# Patient Record
Sex: MALE | Race: OTHER | HISPANIC OR LATINO | Employment: FULL TIME | URBAN - METROPOLITAN AREA
[De-identification: names, ages, dates, MRNs, and addresses within clinical notes are randomized per-mention and may not be internally consistent; named-entity substitution may affect disease eponyms.]

---

## 2019-08-29 ENCOUNTER — APPOINTMENT (EMERGENCY)
Dept: CT IMAGING | Facility: HOSPITAL | Age: 54
End: 2019-08-29

## 2019-08-29 ENCOUNTER — HOSPITAL ENCOUNTER (EMERGENCY)
Facility: HOSPITAL | Age: 54
Discharge: HOME | End: 2019-08-29
Attending: EMERGENCY MEDICINE

## 2019-08-29 VITALS
RESPIRATION RATE: 18 BRPM | OXYGEN SATURATION: 98 % | SYSTOLIC BLOOD PRESSURE: 129 MMHG | DIASTOLIC BLOOD PRESSURE: 81 MMHG | HEART RATE: 78 BPM | TEMPERATURE: 97.8 F

## 2019-08-29 DIAGNOSIS — M54.50 ACUTE LOW BACK PAIN: Primary | ICD-10-CM

## 2019-08-29 PROCEDURE — 72131 CT LUMBAR SPINE W/O DYE: CPT

## 2019-08-29 PROCEDURE — 72128 CT CHEST SPINE W/O DYE: CPT

## 2019-08-29 PROCEDURE — 99284 EMERGENCY DEPT VISIT MOD MDM: CPT | Performed by: EMERGENCY MEDICINE

## 2019-08-29 PROCEDURE — 99283 EMERGENCY DEPT VISIT LOW MDM: CPT

## 2019-08-29 RX ORDER — CYCLOBENZAPRINE HCL 10 MG
10 TABLET ORAL ONCE
Status: COMPLETED | OUTPATIENT
Start: 2019-08-29 | End: 2019-08-29

## 2019-08-29 RX ORDER — NAPROXEN 375 MG/1
375 TABLET ORAL 2 TIMES DAILY WITH MEALS
Qty: 20 TABLET | Refills: 0 | Status: SHIPPED | OUTPATIENT
Start: 2019-08-29 | End: 2019-09-08

## 2019-08-29 RX ORDER — CYCLOBENZAPRINE HCL 10 MG
10 TABLET ORAL 3 TIMES DAILY PRN
Qty: 30 TABLET | Refills: 0 | Status: SHIPPED | OUTPATIENT
Start: 2019-08-29

## 2019-08-29 RX ADMIN — CYCLOBENZAPRINE HYDROCHLORIDE 10 MG: 10 TABLET, FILM COATED ORAL at 17:26

## 2019-08-29 NOTE — DISCHARGE INSTRUCTIONS
Rest  Heat to the area  Naprosyn twice daily to reduce inflammation  Flexeril every 8 hours if needed for muscle spasm caution may make a sleepy  Follow-up through the [facility]

## 2019-08-29 NOTE — ED PROVIDER NOTES
History  Chief Complaint   Patient presents with    Back Pain     Pt presents to ED with back pain x3 weeks after injury at work  Radiating to groin  HPI patient is a 61-year-old male, apparently moving a refrigerator and felt a snap or pop in his back  Patient is being seen by the chiropractor but today apparently had increasing pain and chiropractor reports a loose area along his spine he is concerned about a fracture  Patient was referred to the emergency department for a CT scan to rule out fracture of his thoracic or lumbar spine  Patient reports pain with bending  Patient reports using somewhat muscle relaxers at home with some relief  He denies any numbness or weakness  Past medical history previously healthy  Family history noncontributory  Social history, smoker, employed    None       History reviewed  No pertinent past medical history  History reviewed  No pertinent surgical history  History reviewed  No pertinent family history  I have reviewed and agree with the history as documented  Social History     Tobacco Use    Smoking status: Current Every Day Smoker     Packs/day: 0 20    Smokeless tobacco: Never Used   Substance Use Topics    Alcohol use: Never     Frequency: Never    Drug use: Never        Review of Systems   Constitutional: Negative for fever  HENT: Negative for congestion  Eyes: Negative for pain and redness  Respiratory: Negative for cough and shortness of breath  Cardiovascular: Negative for chest pain  Gastrointestinal: Negative for abdominal pain and vomiting  Musculoskeletal: Positive for back pain  Physical Exam  Physical Exam   Constitutional: He is oriented to person, place, and time  He appears well-developed and well-nourished  HENT:   Head: Normocephalic     Right Ear: External ear normal    Left Ear: External ear normal    Nose: Nose normal    Mouth/Throat: Oropharynx is clear and moist    Eyes: Pupils are equal, round, and reactive to light  EOM and lids are normal    Neck: Normal range of motion  Neck supple  Pulmonary/Chest: Effort normal  No respiratory distress  Abdominal: Soft  There is no tenderness  Musculoskeletal: Normal range of motion  He exhibits no deformity  There is left paraspinal thoracic spine tenderness approximately around T9-T10, there is tenderness of the lumbar spine, no distal weakness, normal strength, neurovascular tendon intact  Neurological: He is alert and oriented to person, place, and time  Skin: Skin is warm and dry  Psychiatric: He has a normal mood and affect  Nursing note and vitals reviewed  Vital Signs  ED Triage Vitals [08/29/19 1712]   Temperature Pulse Respirations Blood Pressure SpO2   97 8 °F (36 6 °C) 78 18 129/81 98 %      Temp Source Heart Rate Source Patient Position - Orthostatic VS BP Location FiO2 (%)   Oral Monitor Standing Right arm --      Pain Score       Worst Possible Pain           Vitals:    08/29/19 1712   BP: 129/81   Pulse: 78   Patient Position - Orthostatic VS: Standing         Visual Acuity      ED Medications  Medications   cyclobenzaprine (FLEXERIL) tablet 10 mg (10 mg Oral Given 8/29/19 1726)       Diagnostic Studies  Results Reviewed     None                 CT spine thoracic & lumbar wo contrast   Final Result by Jolynn Gowers, MD (08/29 1818)      No evidence of thoracic or lumbar spine fracture, osseous lesion or paraspinal mass  Minimal degenerative change  Workstation performed: [id number]                    Procedures  Procedures       ED Course            patient reports hearing a pop or a snap during the episode when he was moving a refrigerator, his chiropractor was concern for fracture so the patient required CT scan which was normal   Discussed with patient advised comprehensive spine program   Discussed analgesics, discussed muscle relaxers    Patient received muscle relaxers here with good improvement  [facility] medical decision making 61-year-old male, reports moving a refrigerator and felt a snap and pop in his back, apparently his chiropractor felt a loose area on his spine that he thought was a fracture  CT shows no fracture here  Discussed outpatient treatment and follow-up   We discussed indications to return  Disposition  Final diagnoses:   Acute low back pain     Time reflects when diagnosis was documented in both MDM as applicable and the Disposition within this note     Time User Action Codes Description Comment    8/29/2019  6:22 PM John Krause Florin [M54 5] Acute low back pain       ED Disposition     ED Disposition Condition Date/Time Comment    Discharge Stable Thu Aug 29, 2019  6:22 PM Madan Herrera discharge to home/self care              Follow-up Information     Follow up With Specialties Details Why Contact Info    [facility] Physical Therapy   [phone]          Discharge Medication List as of 8/29/2019  6:25 PM      START taking these medications    Details   cyclobenzaprine (FLEXERIL) 10 mg tablet Take 1 tablet (10 mg total) by mouth 3 (three) times a day as needed for muscle spasms for up to 30 doses, Starting Thu 8/29/2019, Print      naproxen (NAPROSYN) 375 mg tablet Take 1 tablet (375 mg total) by mouth 2 (two) times a day with meals for 20 doses, Starting Thu 8/29/2019, Until Sun 9/8/2019, Print               ED Provider  Electronically Signed by           Gifty Payne MD  08/29/19 0337

## 2019-08-30 ENCOUNTER — TELEPHONE (OUTPATIENT)
Dept: PHYSICAL THERAPY | Facility: OTHER | Age: 54
End: 2019-08-30

## 2019-12-24 ENCOUNTER — APPOINTMENT (EMERGENCY)
Dept: RADIOLOGY | Facility: HOSPITAL | Age: 54
End: 2019-12-24
Payer: COMMERCIAL

## 2019-12-24 ENCOUNTER — HOSPITAL ENCOUNTER (EMERGENCY)
Facility: HOSPITAL | Age: 54
Discharge: HOME | End: 2019-12-24
Attending: EMERGENCY MEDICINE
Payer: COMMERCIAL

## 2019-12-24 VITALS
OXYGEN SATURATION: 96 % | TEMPERATURE: 98.8 F | DIASTOLIC BLOOD PRESSURE: 82 MMHG | WEIGHT: 208 LBS | BODY MASS INDEX: 30.81 KG/M2 | SYSTOLIC BLOOD PRESSURE: 136 MMHG | RESPIRATION RATE: 18 BRPM | HEART RATE: 87 BPM | HEIGHT: 69 IN

## 2019-12-24 DIAGNOSIS — S61.012A LACERATION OF LEFT THUMB WITHOUT FOREIGN BODY WITHOUT DAMAGE TO NAIL, INITIAL ENCOUNTER: Primary | ICD-10-CM

## 2019-12-24 PROCEDURE — 73140 X-RAY EXAM OF FINGER(S): CPT

## 2019-12-24 PROCEDURE — 99283 EMERGENCY DEPT VISIT LOW MDM: CPT

## 2019-12-24 PROCEDURE — 12001 RPR S/N/AX/GEN/TRNK 2.5CM/<: CPT | Performed by: EMERGENCY MEDICINE

## 2019-12-24 PROCEDURE — 99283 EMERGENCY DEPT VISIT LOW MDM: CPT | Performed by: EMERGENCY MEDICINE

## 2019-12-24 RX ORDER — BACITRACIN, NEOMYCIN, POLYMYXIN B 400; 3.5; 5 [USP'U]/G; MG/G; [USP'U]/G
1 OINTMENT TOPICAL ONCE
Status: DISCONTINUED | OUTPATIENT
Start: 2019-12-24 | End: 2019-12-25 | Stop reason: HOSPADM

## 2019-12-24 RX ORDER — LIDOCAINE HYDROCHLORIDE 10 MG/ML
4 INJECTION, SOLUTION EPIDURAL; INFILTRATION; INTRACAUDAL; PERINEURAL ONCE
Status: DISCONTINUED | OUTPATIENT
Start: 2019-12-24 | End: 2019-12-25 | Stop reason: HOSPADM

## 2019-12-25 NOTE — ED PROVIDER NOTES
History  Chief Complaint   Patient presents with    Laceration     pt reports left thumb laceration while cooking       Laceration   Location:  Finger  Finger laceration location:  L thumb  Length:  2  Depth: Through dermis  Quality: straight    Bleeding: venous    Time since incident:  1 hour  Laceration mechanism:  Knife (Pt was cooking and accidentally cut left thumb with knife)  Pain details:     Quality:  Aching    Severity:  Mild    Timing:  Constant    Progression:  Unchanged  Foreign body present:  No foreign bodies  Relieved by:  Pressure  Worsened by:  Nothing  Ineffective treatments:  None tried  Tetanus status:  Up to date  Associated symptoms: no numbness and no swelling        Prior to Admission Medications   Prescriptions Last Dose Informant Patient Reported? Taking? cyclobenzaprine (FLEXERIL) 10 mg tablet   No No   Sig: Take 1 tablet (10 mg total) by mouth 3 (three) times a day as needed for muscle spasms for up to 30 doses   naproxen (NAPROSYN) 375 mg tablet   No No   Sig: Take 1 tablet (375 mg total) by mouth 2 (two) times a day with meals for 20 doses      Facility-Administered Medications: None       History reviewed  No pertinent past medical history  History reviewed  No pertinent surgical history  History reviewed  No pertinent family history  I have reviewed and agree with the history as documented  Social History     Tobacco Use    Smoking status: Current Every Day Smoker     Packs/day: 0 20    Smokeless tobacco: Never Used   Substance Use Topics    Alcohol use: Never     Frequency: Never    Drug use: Never        Review of Systems   All other systems reviewed and are negative  Physical Exam  Physical Exam   Constitutional: He appears well-developed and well-nourished  Cardiovascular: Normal rate and regular rhythm  Pulmonary/Chest: Effort normal    Musculoskeletal:        Left hand: He exhibits laceration (2cm x 1mm  laceration)   He exhibits normal range of motion, no tenderness, normal capillary refill, no deformity and no swelling  Hands:  Neurological: He is alert  Vitals reviewed  Vital Signs  ED Triage Vitals [12/24/19 2047]   Temperature Pulse Respirations Blood Pressure SpO2   98 8 °F (37 1 °C) 87 18 136/82 96 %      Temp Source Heart Rate Source Patient Position - Orthostatic VS BP Location FiO2 (%)   Oral Monitor Sitting Right arm --      Pain Score       No Pain           Vitals:    12/24/19 2047   BP: 136/82   Pulse: 87   Patient Position - Orthostatic VS: Sitting         Visual Acuity      ED Medications  Medications - No data to display    Diagnostic Studies  Results Reviewed     None                 XR thumb 2 views LEFT   ED Interpretation by Odessa Lee MD (12/24 2202)   Laceration, no bony involvement      Final Result by Pauline Willis MD (12/25 8929)      Soft tissue laceration overlying the left thumb distal phalanx without an acute osseous abnormality  Workstation performed: [id number]                    Procedures  Laceration repair  Date/Time: 12/24/2019 9:57 PM  Performed by: Odessa Lee MD  Authorized by: Odessa Lee MD   Consent: Verbal consent obtained  Consent given by: patient  Anesthesia: digital block    Anesthesia:  Local Anesthetic: lidocaine 1% without epinephrine  Anesthetic total: 4 mL      Procedure Details:  Preparation: Patient was prepped and draped in the usual sterile fashion    Irrigation solution: saline  Irrigation method: syringe  Amount of cleaning: standard  Debridement: none  Degree of undermining: none  Skin closure: 4-0 nylon  Number of sutures: 4  Technique: simple  Approximation: close  Approximation difficulty: simple  Dressing: antibiotic ointment  Patient tolerance: Patient tolerated the procedure well with no immediate complications  Comments: Partial anesthesia with digital block, pt requested no additional medications               ED Course MDM  Number of Diagnoses or Management Options  Laceration of left thumb without foreign body without damage to nail, initial encounter: new and does not require workup     Amount and/or Complexity of Data Reviewed  Tests in the radiology section of CPT®: ordered  Independent visualization of images, tracings, or specimens: yes    Patient Progress  Patient progress: stable        Disposition  Final diagnoses:   Laceration of left thumb without foreign body without damage to nail, initial encounter     Time reflects when diagnosis was documented in both MDM as applicable and the Disposition within this note     Time User Action Codes Description Comment    12/24/2019  9:58 PM Michelle  [address], [address] Laceration of left thumb without foreign body without damage to nail, initial encounter       ED Disposition     ED Disposition Condition Date/Time Comment    Discharge Stable Tue Dec 24, 2019  9:58 PM Coreen Dumont discharge to home/self care  Follow-up Information     Follow up With Specialties Details Why Contact Info Additional [address] [facility] Emergency Department Emergency Medicine Go to  If symptoms worsen, and to have stitches removed in 7-10 days  [address] [facility] [id number]  [address] [place] [address], [address], [address]          Discharge Medication List as of 12/24/2019  9:59 PM      CONTINUE these medications which have NOT CHANGED    Details   cyclobenzaprine (FLEXERIL) 10 mg tablet Take 1 tablet (10 mg total) by mouth 3 (three) times a day as needed for muscle spasms for up to 30 doses, Starting u 8/29/2019, Print      naproxen (NAPROSYN) 375 mg tablet Take 1 tablet (375 mg total) by mouth 2 (two) times a day with meals for 20 doses, Starting Thu 8/29/2019, Until Sun 9/8/2019, Print           No discharge procedures on file      ED Provider  Electronically Signed by Ollie Luu MD  12/28/19 5670

## 2022-01-01 ENCOUNTER — HOSPITAL ENCOUNTER (EMERGENCY)
Facility: HOSPITAL | Age: 57
Discharge: HOME | End: 2022-01-01
Attending: EMERGENCY MEDICINE
Payer: COMMERCIAL

## 2022-01-01 VITALS
WEIGHT: 215 LBS | OXYGEN SATURATION: 94 % | HEIGHT: 69 IN | TEMPERATURE: 98.6 F | SYSTOLIC BLOOD PRESSURE: 116 MMHG | RESPIRATION RATE: 16 BRPM | BODY MASS INDEX: 31.84 KG/M2 | DIASTOLIC BLOOD PRESSURE: 58 MMHG | HEART RATE: 74 BPM

## 2022-01-01 DIAGNOSIS — M54.50 ACUTE LOW BACK PAIN: Primary | ICD-10-CM

## 2022-01-01 PROCEDURE — 96361 HYDRATE IV INFUSION ADD-ON: CPT

## 2022-01-01 PROCEDURE — 96372 THER/PROPH/DIAG INJ SC/IM: CPT

## 2022-01-01 PROCEDURE — 96374 THER/PROPH/DIAG INJ IV PUSH: CPT

## 2022-01-01 PROCEDURE — 96375 TX/PRO/DX INJ NEW DRUG ADDON: CPT

## 2022-01-01 PROCEDURE — 99283 EMERGENCY DEPT VISIT LOW MDM: CPT

## 2022-01-01 PROCEDURE — 99284 EMERGENCY DEPT VISIT MOD MDM: CPT | Performed by: EMERGENCY MEDICINE

## 2022-01-01 RX ORDER — HYDROMORPHONE HCL/PF 1 MG/ML
1 SYRINGE (ML) INJECTION ONCE
Status: COMPLETED | OUTPATIENT
Start: 2022-01-01 | End: 2022-01-01

## 2022-01-01 RX ORDER — ONDANSETRON 2 MG/ML
4 INJECTION INTRAMUSCULAR; INTRAVENOUS ONCE
Status: COMPLETED | OUTPATIENT
Start: 2022-01-01 | End: 2022-01-01

## 2022-01-01 RX ORDER — CYCLOBENZAPRINE HCL 10 MG
10 TABLET ORAL ONCE
Status: COMPLETED | OUTPATIENT
Start: 2022-01-01 | End: 2022-01-01

## 2022-01-01 RX ORDER — DIAZEPAM 5 MG/ML
2.5 INJECTION, SOLUTION INTRAMUSCULAR; INTRAVENOUS ONCE
Status: COMPLETED | OUTPATIENT
Start: 2022-01-01 | End: 2022-01-01

## 2022-01-01 RX ORDER — HYDROMORPHONE HCL/PF 1 MG/ML
0.5 SYRINGE (ML) INJECTION ONCE
Status: COMPLETED | OUTPATIENT
Start: 2022-01-01 | End: 2022-01-01

## 2022-01-01 RX ORDER — KETOROLAC TROMETHAMINE 30 MG/ML
15 INJECTION, SOLUTION INTRAMUSCULAR; INTRAVENOUS ONCE
Status: COMPLETED | OUTPATIENT
Start: 2022-01-01 | End: 2022-01-01

## 2022-01-01 RX ORDER — CYCLOBENZAPRINE HCL 10 MG
10 TABLET ORAL 3 TIMES DAILY PRN
Qty: 30 TABLET | Refills: 0 | Status: SHIPPED | OUTPATIENT
Start: 2022-01-01

## 2022-01-01 RX ORDER — OXYCODONE HYDROCHLORIDE AND ACETAMINOPHEN 5; 325 MG/1; MG/1
2 TABLET ORAL ONCE
Status: COMPLETED | OUTPATIENT
Start: 2022-01-01 | End: 2022-01-01

## 2022-01-01 RX ORDER — OXYCODONE HYDROCHLORIDE AND ACETAMINOPHEN 5; 325 MG/1; MG/1
1 TABLET ORAL EVERY 6 HOURS PRN
Qty: 25 TABLET | Refills: 0 | Status: SHIPPED | OUTPATIENT
Start: 2022-01-01

## 2022-01-01 RX ADMIN — SODIUM CHLORIDE 1000 ML: 0.9 INJECTION, SOLUTION INTRAVENOUS at 17:09

## 2022-01-01 RX ADMIN — OXYCODONE HYDROCHLORIDE AND ACETAMINOPHEN 2 TABLET: 5; 325 TABLET ORAL at 18:29

## 2022-01-01 RX ADMIN — HYDROMORPHONE HYDROCHLORIDE 1 MG: 1 INJECTION, SOLUTION INTRAMUSCULAR; INTRAVENOUS; SUBCUTANEOUS at 19:23

## 2022-01-01 RX ADMIN — CYCLOBENZAPRINE HYDROCHLORIDE 10 MG: 10 TABLET, FILM COATED ORAL at 18:29

## 2022-01-01 RX ADMIN — DIAZEPAM 2.5 MG: 10 INJECTION, SOLUTION INTRAMUSCULAR; INTRAVENOUS at 17:07

## 2022-01-01 RX ADMIN — HYDROMORPHONE HYDROCHLORIDE 0.5 MG: 1 INJECTION, SOLUTION INTRAMUSCULAR; INTRAVENOUS; SUBCUTANEOUS at 17:07

## 2022-01-01 RX ADMIN — ONDANSETRON 4 MG: 2 INJECTION INTRAMUSCULAR; INTRAVENOUS at 17:08

## 2022-01-01 RX ADMIN — KETOROLAC TROMETHAMINE 15 MG: 30 INJECTION, SOLUTION INTRAMUSCULAR at 17:08

## 2022-01-01 NOTE — DISCHARGE INSTRUCTIONS
Rest  Heat to the area  Flexeril every 8 hours as needed for muscle spasm  Percocet 1 every 6 hours as needed for pain caution narcotic will make you sleepy  Follow up with your provider or through the [facility]

## 2022-01-01 NOTE — ED PROVIDER NOTES
History  Chief Complaint   Patient presents with    Back Pain     pt states he was walking earlier today when he felt like he pulled his lower back and is now c/o severe pain  HPI patient is a 37-year-old male reports he was taking out the trash, walking and turned and felt a pull in his right low back  Patient reports a history of herniated disc  Apparently had injections in the past   Patient reports that cases closed  He reports today he was well until he twisted and pulled his back  Now has severe pain and unable to get out of a chair  Wife reports his leg seems swollen since the injury  Patient denies any fever or chills  Denies any incontinence  Denies any numbness  He complains primarily of severe pain which radiates from the right lumbar area down his right leg  Complains of pain with any movement of his right leg  I saw the patient in August of 2019 when he felt something popped in his back and we performed a CT scan at that time which showed no fracture  Past medical history of back problems  Family history noncontributory  Social history, smoker, denies drug abuse    Prior to Admission Medications   Prescriptions Last Dose Informant Patient Reported? Taking? cyclobenzaprine (FLEXERIL) 10 mg tablet   No No   Sig: Take 1 tablet (10 mg total) by mouth 3 (three) times a day as needed for muscle spasms for up to 30 doses   naproxen (NAPROSYN) 375 mg tablet   No No   Sig: Take 1 tablet (375 mg total) by mouth 2 (two) times a day with meals for 20 doses      Facility-Administered Medications: None       History reviewed  No pertinent past medical history  History reviewed  No pertinent surgical history  History reviewed  No pertinent family history  I have reviewed and agree with the history as documented      E-Cigarette/Vaping     E-Cigarette/Vaping Substances     Social History     Tobacco Use    Smoking status: Current Every Day Smoker     Packs/day: 0 20     Types: Cigarettes    Smokeless tobacco: Never Used   Substance Use Topics    Alcohol use: Never    Drug use: Never       Review of Systems   Constitutional: Negative for diaphoresis, fatigue and fever  HENT: Negative for congestion, ear pain, nosebleeds and sore throat  Eyes: Negative for photophobia, pain, discharge and visual disturbance  Respiratory: Negative for cough, choking, chest tightness, shortness of breath and wheezing  Cardiovascular: Negative for chest pain and palpitations  Gastrointestinal: Negative for abdominal distention, abdominal pain, diarrhea and vomiting  Genitourinary: Negative for dysuria, flank pain and frequency  Musculoskeletal: Positive for back pain  Negative for gait problem and joint swelling  Skin: Negative for color change and rash  Neurological: Negative for dizziness, syncope and headaches  Psychiatric/Behavioral: Negative for behavioral problems and confusion  The patient is not nervous/anxious  All other systems reviewed and are negative  Physical Exam  Physical Exam  Vitals and nursing note reviewed  Constitutional:       Appearance: He is well-developed  HENT:      Head: Normocephalic  Right Ear: External ear normal       Left Ear: External ear normal       Nose: Nose normal    Eyes:      General: Lids are normal       Pupils: Pupils are equal, round, and reactive to light  Cardiovascular:      Rate and Rhythm: Normal rate and regular rhythm  Pulses: Normal pulses  Heart sounds: Normal heart sounds  Pulmonary:      Effort: Pulmonary effort is normal  No respiratory distress  Musculoskeletal:         General: No deformity  Cervical back: Normal range of motion and neck supple  Comments: Right-sided low back tenderness, pain with any movement, positive right-sided straight leg raise, patient does have good distal pulses in the right leg  He has flexion extension of his toes  He has dorsiflexion and plantar flexion of the foot  Neurovascular tendon intact   Skin:     General: Skin is warm and dry  Neurological:      Mental Status: He is alert and oriented to person, place, and time  Vital Signs  ED Triage Vitals [01/01/22 1610]   Temperature Pulse Respirations Blood Pressure SpO2   97 8 °F (36 6 °C) 99 17 144/83 96 %      Temp Source Heart Rate Source Patient Position - Orthostatic VS BP Location FiO2 (%)   Temporal Monitor Sitting Left arm --      Pain Score       10 - Worst Possible Pain           Vitals:    01/01/22 1610 01/01/22 1809   BP: 144/83 116/58   Pulse: 99 74   Patient Position - Orthostatic VS: Sitting Lying         Visual Acuity      ED Medications  Medications   sodium chloride 0 9 % bolus 1,000 mL (1,000 mL Intravenous New Bag 1/1/22 1709)   ondansetron (ZOFRAN) injection 4 mg (4 mg Intravenous Given 1/1/22 1708)   HYDROmorphone (DILAUDID) injection 0 5 mg (0 5 mg Intravenous Given 1/1/22 1707)   diazepam (VALIUM) injection 2 5 mg (2 5 mg Intravenous Given 1/1/22 1707)   ketorolac (TORADOL) injection 15 mg (15 mg Intravenous Given 1/1/22 1708)   oxyCODONE-acetaminophen (PERCOCET) 5-325 mg per tablet 2 tablet (2 tablets Oral Given 1/1/22 1829)   cyclobenzaprine (FLEXERIL) tablet 10 mg (10 mg Oral Given 1/1/22 1829)   HYDROmorphone (DILAUDID) injection 1 mg (1 mg Intramuscular Given 1/1/22 1923)       Diagnostic Studies  Results Reviewed     None                 No orders to display              Procedures  Procedures         ED Course      following pain meds the patient was able to move and stand I was able to get him up and move him to the bed  Patient was ambulatory  We discussed all the drug stores are closed the patient would need medications 1st tonight for pain control  Patient reports when he twisted he developed increasing pain so patient was given additional IM medications  Patient was able to stand and walk after the IM medications    We discussed follow-up we discussed slow movement and back support  Patient meets Back pain low risk criteria, no trauma, no fever chills or weight loss, no neurological deficit, no history of cancer, no history of injecting drugs, pain improved with rest                   SBIRT 22yo+      Most Recent Value   SBIRT (22 yo +)    In order to provide better care to our patients, we are screening all of our patients for alcohol and drug use  Would it be okay to ask you these screening questions? Yes Filed at: 01/01/2022 1630   Initial Alcohol Screen: US AUDIT-C     1  How often do you have a drink containing alcohol? 0 Filed at: 01/01/2022 1630   2  How many drinks containing alcohol do you have on a typical day you are drinking? 0 Filed at: 01/01/2022 1630   3a  Male UNDER 65: How often do you have five or more drinks on one occasion? 0 Filed at: 01/01/2022 1630   3b  FEMALE Any Age, or MALE 65+: How often do you have 4 or more drinks on one occassion? 0 Filed at: 01/01/2022 1630   Audit-C Score 0 Filed at: 01/01/2022 1630   NARDA: How many times in the past year have you    Used an illegal drug or used a prescription medication for non-medical reasons? Never Filed at: 01/01/2022 1630                    [facility] medical decision making 56-year-old male, twisted while taking out the trash  complains of a severe pull in his low back  Severe pain with movement, improved with analgesics  No focal deficits, no red flags,  Discussed outpatient treatment follow-up discussed indications to return  Disposition  Final diagnoses:   Acute low back pain     Time reflects when diagnosis was documented in both MDM as applicable and the Disposition within this note     Time User Action Codes Description Comment    1/1/2022  6:18 PM aSra Harris Add [M54 50] Acute low back pain       ED Disposition     ED Disposition Condition Date/Time Comment    Discharge Stable Sat Jan 1, 2022  6:18 PM Edie Durand discharge to home/self care              Follow-up Information     Follow up With Specialties Details Why Contact Info Additional Information    [facility]   [phone]          Patient's Medications   Discharge Prescriptions    CYCLOBENZAPRINE (FLEXERIL) 10 MG TABLET    Take 1 tablet (10 mg total) by mouth 3 (three) times a day as needed for muscle spasms for up to 30 doses       Start Date: 1/1/2022  End Date: --       Order Dose: 10 mg       Quantity: 30 tablet    Refills: 0    OXYCODONE-ACETAMINOPHEN (PERCOCET) 5-325 MG PER TABLET    Take 1 tablet by mouth every 6 (six) hours as needed for moderate pain or severe pain for up to 25 doses Max Daily Amount: 4 tablets       Start Date: 1/1/2022  End Date: --       Order Dose: 1 tablet       Quantity: 25 tablet    Refills: 0           PDMP Review     None          ED Provider  Electronically Signed by           Lenore Peterson MD  01/01/22 1954

## 2022-01-01 NOTE — ED NOTES
Patient is discharged to home at this time  VSS  IV removed  Medications given  A VS given and patient expressed understanding        Karyle Horns, RN  01/01/22 5787

## 2022-01-03 ENCOUNTER — NURSE TRIAGE (OUTPATIENT)
Dept: PHYSICAL THERAPY | Facility: OTHER | Age: 57
End: 2022-01-03

## 2022-01-03 DIAGNOSIS — M54.50 ACUTE EXACERBATION OF CHRONIC LOW BACK PAIN: Primary | ICD-10-CM

## 2022-01-03 DIAGNOSIS — G89.29 ACUTE EXACERBATION OF CHRONIC LOW BACK PAIN: Primary | ICD-10-CM

## 2022-01-03 NOTE — TELEPHONE ENCOUNTER
Additional Information   Negative: Is this related to a work injury?  Negative: Is this related to an MVA?  Negative: Are you currently recieving homecare services?  Negative: Has the patient had unexplained weight loss?  Negative: Does the patient have a fever?  Negative: Is the patient experiencing urine retention?  Negative: Is the patient experiencing acute drop foot or paralysis?  Negative: Has the patient experienced major trauma? (fall from height, high speed collision, direct blow to spine) and is also experiencing nausea, light-headedness, or loss of consciousness?  Negative: Is the patient experiencing blood in sputum?  Negative: Is this a chronic condition? Background - Initial Assessment  Clinical complaint: right lower back pain which radiates down to the right ankle with numbness and tingling  States he has a history of a work/ back injury 2019 and had injections  States this epispode started on 12/31 when taking out the garbage  Date of onset: 12/31/2021  Frequency of pain: constant  Quality of pain: sharp and stabbing    Protocols used: [facility] COMPREHENSIVE SPINE PROGRAM PROTOCOL    This RN did review in detail the Comprehensive Spine Program and what we can provide for their back pain  Patient is agreeable to being triaged by this RN and would like to proceed with Physical Therapy  Referral was placed for Physical Therapy at the [facility] site  Patients information was sent to the  to make evaluation appointment  Patient made aware that the PT office  will be calling to schedule the appointment  Patient was provided with the phone number to the PT office  No further questions and/or concerns were voiced by the patient at this time  Patient states understanding of the referral that was placed  Referral Closed

## 2022-08-25 ENCOUNTER — APPOINTMENT (OUTPATIENT)
Dept: RADIOLOGY | Facility: HOSPITAL | Age: 57
End: 2022-08-25
Payer: MEDICARE

## 2022-08-25 ENCOUNTER — HOSPITAL ENCOUNTER (EMERGENCY)
Facility: HOSPITAL | Age: 57
Discharge: HOME | End: 2022-08-25
Attending: EMERGENCY MEDICINE
Payer: MEDICARE

## 2022-08-25 VITALS
DIASTOLIC BLOOD PRESSURE: 78 MMHG | TEMPERATURE: 97.9 F | SYSTOLIC BLOOD PRESSURE: 133 MMHG | HEIGHT: 69 IN | HEART RATE: 87 BPM | WEIGHT: 240 LBS | BODY MASS INDEX: 35.55 KG/M2 | RESPIRATION RATE: 18 BRPM | OXYGEN SATURATION: 97 %

## 2022-08-25 DIAGNOSIS — M25.462 EFFUSION OF LEFT KNEE: ICD-10-CM

## 2022-08-25 DIAGNOSIS — S62.501A FRACTURE OF UNSPECIFIED PHALANX OF RIGHT THUMB, INITIAL ENCOUNTER FOR CLOSED FRACTURE: Primary | ICD-10-CM

## 2022-08-25 PROCEDURE — 99283 EMERGENCY DEPT VISIT LOW MDM: CPT

## 2022-08-25 PROCEDURE — 73564 X-RAY EXAM KNEE 4 OR MORE: CPT

## 2022-08-25 PROCEDURE — 29125 APPL SHORT ARM SPLINT STATIC: CPT | Performed by: NURSE PRACTITIONER

## 2022-08-25 PROCEDURE — 99284 EMERGENCY DEPT VISIT MOD MDM: CPT | Performed by: NURSE PRACTITIONER

## 2022-08-25 PROCEDURE — 73130 X-RAY EXAM OF HAND: CPT

## 2022-08-25 RX ORDER — OXYCODONE HYDROCHLORIDE AND ACETAMINOPHEN 5; 325 MG/1; MG/1
1 TABLET ORAL EVERY 6 HOURS PRN
Qty: 20 TABLET | Refills: 0 | Status: SHIPPED | OUTPATIENT
Start: 2022-08-25

## 2022-08-25 RX ORDER — HYDROCODONE BITARTRATE AND ACETAMINOPHEN 5; 325 MG/1; MG/1
1 TABLET ORAL ONCE
Status: COMPLETED | OUTPATIENT
Start: 2022-08-25 | End: 2022-08-25

## 2022-08-25 RX ADMIN — HYDROCODONE BITARTRATE AND ACETAMINOPHEN 1 TABLET: 5; 325 TABLET ORAL at 19:38

## 2022-08-25 NOTE — ED PROVIDER NOTES
History  Chief Complaint   Patient presents with    Fall     Pt states he tripped over a jet skit trailer and dislocated his left knee but pushed it back in and felt the weight of his body fall onto his right hand and injured his right thumb  Denies head strike and denies thinners      65-year-old male presenting here after having a fall  Accidentally tripped and tried to brace himself and protect his face and injured his right thumb in his left knee  He reports his right thumb was dislocated his left knee was also dislocated  Prior to Admission Medications   Prescriptions Last Dose Informant Patient Reported? Taking? cyclobenzaprine (FLEXERIL) 10 mg tablet   No No   Sig: Take 1 tablet (10 mg total) by mouth 3 (three) times a day as needed for muscle spasms for up to 30 doses   cyclobenzaprine (FLEXERIL) 10 mg tablet   No No   Sig: Take 1 tablet (10 mg total) by mouth 3 (three) times a day as needed for muscle spasms for up to 30 doses   naproxen (NAPROSYN) 375 mg tablet   No No   Sig: Take 1 tablet (375 mg total) by mouth 2 (two) times a day with meals for 20 doses   oxyCODONE-acetaminophen (PERCOCET) 5-325 mg per tablet   No No   Sig: Take 1 tablet by mouth every 6 (six) hours as needed for moderate pain or severe pain for up to 25 doses Max Daily Amount: 4 tablets      Facility-Administered Medications: None       No past medical history on file  No past surgical history on file  No family history on file  I have reviewed and agree with the history as documented  E-Cigarette/Vaping     E-Cigarette/Vaping Substances     Social History     Tobacco Use    Smoking status: Current Every Day Smoker     Packs/day: 0 20     Types: Cigarettes    Smokeless tobacco: Never Used   Substance Use Topics    Alcohol use: Never    Drug use: Never       Review of Systems   Constitutional: Negative for diaphoresis, fatigue and fever  HENT: Negative for congestion, ear pain, nosebleeds and sore throat  Eyes: Negative for photophobia, pain, discharge and visual disturbance  Respiratory: Negative for cough, choking, chest tightness, shortness of breath and wheezing  Cardiovascular: Negative for chest pain and palpitations  Gastrointestinal: Negative for abdominal distention, abdominal pain, diarrhea and vomiting  Genitourinary: Negative for dysuria, flank pain and frequency  Musculoskeletal: Positive for gait problem  Negative for back pain and joint swelling  Skin: Negative for color change and rash  Neurological: Negative for dizziness, syncope and headaches  Psychiatric/Behavioral: Negative for behavioral problems and confusion  The patient is not nervous/anxious  All other systems reviewed and are negative  Physical Exam  Physical Exam  Vitals and nursing note reviewed  Constitutional:       General: He is not in acute distress  Appearance: He is well-developed  HENT:      Head: Normocephalic and atraumatic  Eyes:      General:         Right eye: No discharge  Left eye: No discharge  Conjunctiva/sclera: Conjunctivae normal    Cardiovascular:      Rate and Rhythm: Normal rate  Pulmonary:      Effort: Pulmonary effort is normal  No respiratory distress  Abdominal:      General: There is no distension  Tenderness: There is no guarding  Musculoskeletal:         General: No deformity  Right hand: Swelling, tenderness and bony tenderness (right thumb) present  Cervical back: Normal range of motion and neck supple  Left knee: Swelling and effusion present  Skin:     General: Skin is warm and dry  Neurological:      Mental Status: He is alert and oriented to person, place, and time        Coordination: Coordination normal          Vital Signs  ED Triage Vitals   Temperature Pulse Respirations Blood Pressure SpO2   08/25/22 1831 08/25/22 1831 08/25/22 1831 08/25/22 1831 08/25/22 1831   97 9 °F (36 6 °C) 87 18 133/78 97 %      Temp Source Heart Rate Source Patient Position - Orthostatic VS BP Location FiO2 (%)   08/25/22 1831 08/25/22 1831 08/25/22 1831 08/25/22 1831 --   Oral Monitor Sitting Left arm       Pain Score       08/25/22 2026       3           Vitals:    08/25/22 1831   BP: 133/78   Pulse: 87   Patient Position - Orthostatic VS: Sitting         Visual Acuity  Visual Acuity    Flowsheet Row Most Recent Value   L Pupil Size (mm) 3   R Pupil Size (mm) 3          ED Medications  Medications   HYDROcodone-acetaminophen (NORCO) 5-325 mg per tablet 1 tablet (1 tablet Oral Given 8/25/22 1938)       Diagnostic Studies  Results Reviewed     None                 XR hand 3+ views RIGHT    (Results Pending)   XR knee 4+ views left injury    (Results Pending)              Procedures  Splint application    Date/Time: 8/25/2022 8:25 PM  Performed by: HERMELINDO Bradley  Authorized by: HERMELINDO Bradley   Universal Protocol:  Consent: The procedure was performed in an emergent situation  Verbal consent obtained  Risks and benefits: risks, benefits and alternatives were discussed  Consent given by: patient  Patient identity confirmed: verbally with patient and arm band      Pre-procedure details:     Sensation:  Normal  Procedure details:     Laterality:  Left    Location:  Finger    Finger:  R thumbCast type:  Short leg walking      Splint type:  Thumb spica    Supplies:  Cotton padding and Ortho-Glass             ED Course                               SBIRT 22yo+    Flowsheet Row Most Recent Value   SBIRT (23 yo +)    In order to provide better care to our patients, we are screening all of our patients for alcohol and drug use  Would it be okay to ask you these screening questions? No Filed at: 08/25/2022 6023   Initial Alcohol Screen: US AUDIT-C     1   How often do you have a drink containing alcohol? 0 Filed at: 08/25/2022 1915   Audit-C Score 0 Filed at: 08/25/2022 7759                    MDM  Number of Diagnoses or Management Options  Effusion of left knee: new and requires workup  Fracture of unspecified phalanx of right thumb, initial encounter for closed fracture: new and requires workup  Diagnosis management comments: Neurovascularly intact after splinting of the left knee with a neoprene hinged brace and right thumb with a thumb spica  Amount and/or Complexity of Data Reviewed  Tests in the radiology section of CPT®: ordered and reviewed    Patient Progress  Patient progress: improved      Disposition  Final diagnoses:   Fracture of unspecified phalanx of right thumb, initial encounter for closed fracture   Effusion of left knee     Time reflects when diagnosis was documented in both MDM as applicable and the Disposition within this note     Time User Action Codes Description Comment    8/25/2022  8:22 PM Christine Castelan Add [S62 [id number]] Fracture of unspecified phalanx of right thumb, initial encounter for closed fracture     8/25/2022  8:23 PM Christine Catherine [[id number]] Effusion of left knee       ED Disposition     ED Disposition   Discharge    Condition   Stable    Date/Time   Thu Aug 25, 2022  8:22 PM    Comment   Karina Carroll discharge to home/self care                 Follow-up Information     Follow up With Specialties Details Why Contact Info Additional [phone] [facility] Orthopedic Surgery Schedule an appointment as soon as possible for a visit  For Continued Evaluation [address] [facility] 42 [id number]  [address] [place] [address] Specialists Am[facility] Vasquez, [address], Amaris Vasquez, [place], [address] [facility]          Patient's Medications   Discharge Prescriptions    OXYCODONE-ACETAMINOPHEN (PERCOCET) 5-325 MG PER TABLET    Take 1 tablet by mouth every 6 (six) hours as needed for severe pain for up to 20 doses Max Daily Amount: 4 tablets       Start Date: 8/25/2022 End Date: --       Order Dose: 1 tablet       Quantity: 20 tablet Refills: 0       No discharge procedures on file      PDMP Review     None          ED Provider  Electronically Signed by           HERMELINDO Washington  08/25/22 2026

## 2022-08-29 ENCOUNTER — OFFICE VISIT (OUTPATIENT)
Dept: OBGYN CLINIC | Facility: CLINIC | Age: 57
End: 2022-08-29
Payer: MEDICARE

## 2022-08-29 VITALS
DIASTOLIC BLOOD PRESSURE: 75 MMHG | SYSTOLIC BLOOD PRESSURE: 124 MMHG | WEIGHT: 234 LBS | BODY MASS INDEX: 34.66 KG/M2 | HEIGHT: 69 IN | HEART RATE: 95 BPM | RESPIRATION RATE: 18 BRPM

## 2022-08-29 DIAGNOSIS — S83.412A SPRAIN OF MEDIAL COLLATERAL LIGAMENT OF LEFT KNEE, INITIAL ENCOUNTER: ICD-10-CM

## 2022-08-29 DIAGNOSIS — S62.514A CLOSED NONDISPLACED FRACTURE OF PROXIMAL PHALANX OF RIGHT THUMB, INITIAL ENCOUNTER: Primary | ICD-10-CM

## 2022-08-29 PROCEDURE — 99204 OFFICE O/P NEW MOD 45 MIN: CPT | Performed by: FAMILY MEDICINE

## 2022-08-29 RX ORDER — NAPROXEN 375 MG/1
375 TABLET ORAL 2 TIMES DAILY WITH MEALS
Qty: 60 TABLET | Refills: 0 | Status: SHIPPED | OUTPATIENT
Start: 2022-08-29 | End: 2022-10-06

## 2022-08-29 RX ORDER — NAPROXEN 375 MG/1
375 TABLET ORAL 2 TIMES DAILY WITH MEALS
Qty: 60 TABLET | Refills: 0 | Status: SHIPPED | OUTPATIENT
Start: 2022-08-29 | End: 2022-08-29

## 2022-08-29 NOTE — PROGRESS NOTES
Subjective:  Chief Complaint   Patient presents with    Right Hand - Pain    Left Knee - Clicking, Swelling     Patient accompanied by son, Ayesha Michaud who is present for entirety of today's visit    Sharyle Finch is a 49 y o  male presenting for initial evaluation of right first digit proximal phalanx fracture  Injury was sustained on 8/25 when patient states he was unloading a jet ski and jammed his thumb after tripping onto the loading belt  Patient reports that he went to [facility] ED where radiographs were obtained showing nondisplaced right prox phalanx fracture  Patient was placed into thumb spica splint and instructed to follow up in office today  Patient is reporting no pain at current time  States that his thumb has been still a bit swollen and painful with use/touch but overall feels pretty good with naproxen  Denies numbness, tingling, denies ecchymosis  Patient additionally reporting left knee pain after fall on 8/25  States that when he fell his knee buckled inward  Initial radiographs were unremarkable for fracture or osseous abnormality  Patient denies hearing a pop but states that pain was localized toward the medial aspect of the knee  Patient has been able to ambulate without difficulty and is currently in hinged knee brace provided by the ED  No swelling reported  No numbness or tingling reported in lower extremity  The following portions of the patient's history were reviewed and updated as appropriate: allergies, current medications, past family history, past medical history, past social history, past surgical history and problem list       Review of Systems   Constitutional: Negative for fever  Musculoskeletal: positive for hand pain  Positive for left knee pain  Skin: Negative for rash and ulcer     Neurological: Negative for numbness or tingling in hand       Objective:  /75   Pulse 95   Resp 18   Ht 5' 9" (1 753 m)   Wt 106 kg (234 lb)   BMI 34 56 kg/m²     Skin: no rashes, lesions, skin discolorations, lacerations  Neurologic: Neurologic exam is normal throughout upper extremities, Awake, alert, and oriented x3, no apparent distress  Musculoskeletal:   Right hand:   inspection: no gross deformity  No ecchymosis  Notable swelling in 1st digit  Palpation: + TTP at 1st IP joint  No snuffbox tenderness  No TTP over 1st metacarpal    ROM:  FROM in digitis 2-5 with 5/5 strength with abduction, adduction, finger flexion  Limited PROM and AROM with 1st finger flexion 2/2 to pain    Skin: no rashes, lesions, skin discolorations, lacerations  Vasculature: normal popliteal and pedal pulse, normal skin color, normal capillary refill in extremity, no lower extremity edema  Neurologic: Neurologic exam is normal throughout lower extremities, Awake, alert, and oriented x3, no apparent distress  Musculoskeletal: Left knee  Gait: limping gait negative  Inspection:No erythema, no induration  There is no gross deformity  Palpation: Swelling: negative  Effusion: negative  Medial joint line TTP: negative  Lateral joint line TTP: negative  Medial joint line TTP: negative  ROM: Full flexion and extension  Special tests: [facility]'s: negative, Apley's compression: negative  Instability to varus/valgus stress: negative; + Pain with valgus stress testing   Anterior Drawer: negative   Posterior Drawer: negative            Imaging:    XR hand 3+ views RIGHT    Result Date: 8/26/2022  Narrative: RIGHT HAND INDICATION:   injury   COMPARISON:  None VIEWS:  XR HAND 3+ VW RIGHT Images: 4 For the purposes of institution wide universal language the following terms will apply: (thumb=1st digit/finger, index finger=2nd digit/finger, long finger=3rd digit/finger, ring=4th digit/finger and small finger=5th digit/finger) FINDINGS: Suspicion of acute nondisplaced fracture at the neck of the first proximal phalanx Somewhat linear calcifications present at the dorsal aspect of the first interphalangeal joint likely representing capsular or tendon calcifications No lytic or blastic osseous lesion  Soft tissues are unremarkable  Impression: Suspected acute nondisplaced fracture first proximal phalanx Workstation performed: [id number]     XR knee 4+ views left injury    Result Date: 8/26/2022  Narrative: LEFT KNEE INDICATION:   fall  COMPARISON:  None VIEWS:  XR KNEE 4+ VW LEFT INJURY Images: 4 FINDINGS: There is no acute fracture or dislocation  There is no joint effusion  No significant degenerative changes  No lytic or blastic osseous lesion  Mild prepatellar soft tissue plane distortion suspicious for acute traumatic contusion     Impression: Probable prepatellar soft tissue contusion No acute osseous abnormality  Workstation performed: [id number]        Assessment/Plan:    1  Closed nondisplaced fracture of proximal phalanx of right thumb, initial encounter  -Radiographs reviewed independently revealing nondisplaced fracture involving the proximal phalanx of right 1st digit  We discussed the treatment plan at length and the detailed treatment approach  Patient placed in thumb splint to immobilize the affected proximal phalanx  I recommended patient to remove splint 2-3 times daily and do gentle ranging exercises but advised against any lifting or activities with the affected digit  I advised the patient to continue with naproxen BID for 10 days then to be used as needed moving forward  They were instructed to discontinue this medication is they experienced any upset stomach  Directed to ice the area daily for 20 minutes at a time using a barrier to protect the skin- stressed specifically icing after activity to address inflammation  We will plan to follow up in 2-3 weeks at which time will re-evaluate and start OT   - naproxen (NAPROSYN) 375 mg tablet; Take 1 tablet (375 mg total) by mouth 2 (two) times a day with meals for 60 doses  Dispense: 60 tablet; Refill: 0    2   Sprain of medial collateral ligament of left knee, initial encounter  -Patient experiencing grade I MCL sprain  Advised patient to continue in hinged knee brace to prevent valgus stress and will follow up in 3 weeks for re-evaluation  Did advised patient that he can remove brace to sleep and when sitting but to keep when ambulating and doing activities  Follow up in 3 weeks at which time repeat XR of right hand to be obtained

## 2022-09-19 ENCOUNTER — OFFICE VISIT (OUTPATIENT)
Dept: OBGYN CLINIC | Facility: CLINIC | Age: 57
End: 2022-09-19
Payer: MEDICARE

## 2022-09-19 ENCOUNTER — APPOINTMENT (OUTPATIENT)
Dept: RADIOLOGY | Facility: CLINIC | Age: 57
End: 2022-09-19
Payer: MEDICARE

## 2022-09-19 VITALS
WEIGHT: 230 LBS | DIASTOLIC BLOOD PRESSURE: 98 MMHG | HEART RATE: 69 BPM | SYSTOLIC BLOOD PRESSURE: 146 MMHG | BODY MASS INDEX: 34.07 KG/M2 | HEIGHT: 69 IN

## 2022-09-19 DIAGNOSIS — S62.514A CLOSED NONDISPLACED FRACTURE OF PROXIMAL PHALANX OF RIGHT THUMB, INITIAL ENCOUNTER: ICD-10-CM

## 2022-09-19 DIAGNOSIS — S83.412A SPRAIN OF MEDIAL COLLATERAL LIGAMENT OF LEFT KNEE, INITIAL ENCOUNTER: ICD-10-CM

## 2022-09-19 DIAGNOSIS — S62.514A CLOSED NONDISPLACED FRACTURE OF PROXIMAL PHALANX OF RIGHT THUMB, INITIAL ENCOUNTER: Primary | ICD-10-CM

## 2022-09-19 PROCEDURE — 99214 OFFICE O/P EST MOD 30 MIN: CPT | Performed by: FAMILY MEDICINE

## 2022-09-19 PROCEDURE — 73130 X-RAY EXAM OF HAND: CPT

## 2022-09-19 NOTE — PROGRESS NOTES
Subjective:  Chief Complaint   Patient presents with    Right Thumb - Follow-up    Left Knee - Follow-up     Patient accompanied by son, Howard Headley who is present for entirety of today's visit    Bridget Barnett is a 49 y o  male presenting for follow-up visit for closed fracture of proximal phalanx of the right thumb and sprain of the MCL of left knee  Patient was immobilized in a thumb splint at last visit instructed to remove splint to do gentle range of motion exercises as tolerated  Patient was ischial E placed in a hinged knee brace for left MCL sprain  Patient has remained compliant with both bracing recommendations  Patient states that right thumb pain has improved significantly and has virtually no pain at all today  Does note certain days he does have a throbbing sensation which is quite painful however this does not occur every day  Patient's main complaint at this time regards to the thumb is that he has very limited motion at the IP joint and associated swelling still remains    In regards to the knee pain, patient reports that pain has essentially resolved but does note that he feels the occasional cracking, snapping sensation      The following portions of the patient's history were reviewed and updated as appropriate: allergies, current medications, past family history, past medical history, past social history, past surgical history and problem list       Review of Systems   Constitutional: Negative for fever  Musculoskeletal:  Negative for hand or left knee  Positive for right thumb swelling Skin: Negative for rash and ulcer  Neurological: Negative for numbness or tingling in hand       Objective:  Ht 5' 9" (1 753 m)   Wt 104 kg (230 lb)   BMI 33 97 kg/m²     Skin: no rashes, lesions, skin discolorations, lacerations  Neurologic: Neurologic exam is normal throughout upper extremities, Awake, alert, and oriented x3, no apparent distress     Musculoskeletal:   Right hand:   inspection: no gross deformity  No ecchymosis  Notable swelling in 1st digit persist   Palpation:  Negative TTP at 1st IP joint  No snuffbox tenderness  No TTP over 1st metacarpal    ROM:  FROM in digitis 2-5 with 5/5 strength with abduction, adduction, finger flexion  Has full range of motion with flexion and extension with 5/5 strength at the right MCP joint however has limited flexion at the thumb IP joint secondary to swelling  Skin: no rashes, lesions, skin discolorations, lacerations  Vasculature: normal popliteal and pedal pulse, normal skin color, normal capillary refill in extremity, no lower extremity edema  Neurologic: Neurologic exam is normal throughout lower extremities, Awake, alert, and oriented x3, no apparent distress  Musculoskeletal: Left knee  Gait: limping gait negative  Inspection:No erythema, no induration  There is no gross deformity  Palpation: Swelling: negative  Effusion: negative  Medial joint line TTP: negative  Lateral joint line TTP: negative  Medial joint line TTP: negative  No tenderness to palpation along the MCL  ROM: Full flexion and extension  Special tests: [facility]'s: negative, Apley's compression: negative  Instability to varus/valgus stress: negative; negative Pain with valgus stress testing   Anterior Drawer: negative   Posterior Drawer: negative            Imaging:    XR hand 3+ views RIGHT    Result Date: 8/26/2022  Narrative: RIGHT HAND INDICATION:   injury   COMPARISON:  None VIEWS:  XR HAND 3+ VW RIGHT Images: 4 For the purposes of institution wide universal language the following terms will apply: (thumb=1st digit/finger, index finger=2nd digit/finger, long finger=3rd digit/finger, ring=4th digit/finger and small finger=5th digit/finger) FINDINGS: Suspicion of acute nondisplaced fracture at the neck of the first proximal phalanx Somewhat linear calcifications present at the dorsal aspect of the first interphalangeal joint likely representing capsular or tendon calcifications No lytic or blastic osseous lesion  Soft tissues are unremarkable  Impression: Suspected acute nondisplaced fracture first proximal phalanx Workstation performed: [id number]     XR knee 4+ views left injury    Result Date: 8/26/2022  Narrative: LEFT KNEE INDICATION:   fall  COMPARISON:  None VIEWS:  XR KNEE 4+ VW LEFT INJURY Images: 4 FINDINGS: There is no acute fracture or dislocation  There is no joint effusion  No significant degenerative changes  No lytic or blastic osseous lesion  Mild prepatellar soft tissue plane distortion suspicious for acute traumatic contusion     Impression: Probable prepatellar soft tissue contusion No acute osseous abnormality  Workstation performed: [id number]        Assessment/Plan:    1  Closed nondisplaced fracture of proximal phalanx of right thumb, initial encounter  -Radiographs reviewed independently revealing stable fracture involving the proximal phalanx of right 1st digit  No interval healing has been seen on x-ray however clinically patient has improved  We discussed continued immobilization in the some splint for another 2-3 weeks with recommended follow-up at that time for repeat radiographs  Given improvement of pain symptoms, I am recommending that patient initiate with occupational therapy for range of motion for the right thumb  I advised the patient to continue with naproxen BID for 10 days then to be used as needed moving forward  They were instructed to discontinue this medication is they experienced any upset stomach  Directed to ice the area daily for 20 minutes at a time using a barrier to protect the skin- stressed specifically icing after activity to address inflammation  We will plan to follow up in 2-3 weeks at which time will re-evaluate with repeat x-rays   - naproxen (NAPROSYN) 375 mg tablet; Take 1 tablet (375 mg total) by mouth 2 (two) times a day with meals for 60 doses  Dispense: 60 tablet; Refill: 0    2   Sprain of medial collateral ligament of left knee, initial encounter  Patient improved in terms of left knee symptoms  I am recommending that he transition out of the hinged knee brace  Did offer physical therapy for patient however patient would like to do home exercises at this time        Follow-up 2-3 weeks for repeat x-ray imaging

## 2022-10-04 ENCOUNTER — APPOINTMENT (OUTPATIENT)
Dept: RADIOLOGY | Facility: CLINIC | Age: 57
End: 2022-10-04
Payer: MEDICARE

## 2022-10-04 DIAGNOSIS — S62.514D CLOSED NONDISPLACED FRACTURE OF PROXIMAL PHALANX OF RIGHT THUMB WITH ROUTINE HEALING, SUBSEQUENT ENCOUNTER: ICD-10-CM

## 2022-10-04 PROCEDURE — 73130 X-RAY EXAM OF HAND: CPT

## 2022-10-06 ENCOUNTER — OFFICE VISIT (OUTPATIENT)
Dept: OBGYN CLINIC | Facility: CLINIC | Age: 57
End: 2022-10-06
Payer: MEDICARE

## 2022-10-06 VITALS
SYSTOLIC BLOOD PRESSURE: 136 MMHG | RESPIRATION RATE: 18 BRPM | OXYGEN SATURATION: 98 % | DIASTOLIC BLOOD PRESSURE: 84 MMHG | BODY MASS INDEX: 34.36 KG/M2 | WEIGHT: 232 LBS | HEIGHT: 69 IN | HEART RATE: 84 BPM

## 2022-10-06 DIAGNOSIS — S62.501G: Primary | ICD-10-CM

## 2022-10-06 DIAGNOSIS — S62.514D CLOSED NONDISPLACED FRACTURE OF PROXIMAL PHALANX OF RIGHT THUMB WITH ROUTINE HEALING, SUBSEQUENT ENCOUNTER: ICD-10-CM

## 2022-10-06 PROCEDURE — 99214 OFFICE O/P EST MOD 30 MIN: CPT | Performed by: FAMILY MEDICINE

## 2022-10-06 NOTE — PROGRESS NOTES
Subjective:  Chief Complaint   Patient presents with    Right Thumb - Follow-up, Swelling, Pain     Patient accompanied by son, Marley Sacks who is present for entirety of today's visit    Richy Alston is a 49 y o  male presenting for follow-up visit for closed fracture of proximal phalanx of the right thumb  Patient was seen at last visit 2 weeks prior at which time swelling and motion remained an issue  Pain had been slightly improving  Patient was transitioned out of the thumb splint; nikki now notes that the distal phalanx looks crooked and shifts easily with a click  Pain is worsened  Does not report any recent trauma to the thumb  The following portions of the patient's history were reviewed and updated as appropriate: allergies, current medications, past family history, past medical history, past social history, past surgical history and problem list       Review of Systems   Constitutional: Negative for fever  Musculoskeletal:  Negative for hand or left knee  Positive for right thumb swelling Skin: Negative for rash and ulcer  Neurological: Negative for numbness or tingling in hand       Objective:  /84   Pulse 84   Resp 18   Ht 5' 9" (1 753 m)   Wt 105 kg (232 lb)   SpO2 98%   BMI 34 26 kg/m²     Skin: no rashes, lesions, skin discolorations, lacerations  Neurologic: Neurologic exam is normal throughout upper extremities, Awake, alert, and oriented x3, no apparent distress  Musculoskeletal:   Right hand:   inspection: no gross deformity  No ecchymosis  Notable swelling in 1st digit persist   Notable radial deviation of the distal phalanx of the 1st digit at rest   Palpation:  Negative TTP at 1st IP joint  No snuffbox tenderness  No TTP over 1st metacarpal    ROM:  FROM in digitis 2-5 with 5/5 strength with abduction, adduction, finger flexion    Has full range of motion with flexion and extension with 5/5 strength at the right MCP joint however has limited flexion at the thumb IP joint secondary to swelling and pain  Notable laxity with thumb radial stress testing               Imaging:         Assessment/Plan:    1  Closed nondisplaced fracture of proximal phalanx of right thumb with routine healing, subsequent encounter    2  Closed fracture subluxation of interphalangeal joint of right thumb with delayed healing, subsequent encounter    - Ambulatory Referral to Hand Surgery; Future  - MRI thumb right wo contrast; Future  - Durable Medical Equipment  -Patient displaying subluxation of the distal phalanx 1st digit at the IP joint with continued pain and swelling  Will order MRI for further evaluation (collateral ligament injury suspected with possible stener lesion ?)  Referral to hand surgery placed for evaluation   Patient placed in thumb spica brace until further evaluation

## 2022-10-07 ENCOUNTER — TELEPHONE (OUTPATIENT)
Dept: OBGYN CLINIC | Facility: HOSPITAL | Age: 57
End: 2022-10-07

## 2022-10-07 NOTE — TELEPHONE ENCOUNTER
Caller: Daniela Dennis    Doctor: HAND    Reason for call: Daniela Dennis is calling to schedule the patient for a Closed fracture subluxation of interphalangeal joint of right thumb with delayed healing (referral in chart)  Patient is scheduled for an MRI 10/26/2022    Call back#:  Daniela Dennis [phone]

## 2022-10-07 NOTE — TELEPHONE ENCOUNTER
Patient is being referred to a hand specialist  Please schedule accordingly      Radha [address]   [phone]

## 2022-10-26 ENCOUNTER — HOSPITAL ENCOUNTER (OUTPATIENT)
Dept: MRI IMAGING | Facility: HOSPITAL | Age: 57
Discharge: HOME | End: 2022-10-26
Attending: FAMILY MEDICINE

## 2022-10-26 DIAGNOSIS — S62.501G: ICD-10-CM

## 2022-11-01 ENCOUNTER — OFFICE VISIT (OUTPATIENT)
Dept: OBGYN CLINIC | Facility: CLINIC | Age: 57
End: 2022-11-01

## 2022-11-01 VITALS
SYSTOLIC BLOOD PRESSURE: 120 MMHG | HEIGHT: 69 IN | DIASTOLIC BLOOD PRESSURE: 77 MMHG | BODY MASS INDEX: 34.36 KG/M2 | WEIGHT: 232 LBS | HEART RATE: 78 BPM

## 2022-11-01 DIAGNOSIS — S63.124A CLOSED DISLOCATION OF INTERPHALANGEAL JOINT OF RIGHT THUMB, INITIAL ENCOUNTER: Primary | ICD-10-CM

## 2022-11-01 RX ORDER — CHLORHEXIDINE GLUCONATE 0.12 MG/ML
15 RINSE ORAL ONCE
OUTPATIENT
Start: 2022-11-01 | End: 2022-11-01

## 2022-11-30 ENCOUNTER — ANESTHESIA EVENT (OUTPATIENT)
Dept: PERIOP | Facility: AMBULARY SURGERY CENTER | Age: 57
End: 2022-11-30

## 2022-12-02 ENCOUNTER — OFFICE VISIT (OUTPATIENT)
Dept: LAB | Facility: HOSPITAL | Age: 57
End: 2022-12-02

## 2022-12-02 ENCOUNTER — APPOINTMENT (OUTPATIENT)
Dept: RADIOLOGY | Facility: HOSPITAL | Age: 57
End: 2022-12-02

## 2022-12-02 DIAGNOSIS — S63.124A CLOSED DISLOCATION OF INTERPHALANGEAL JOINT OF RIGHT THUMB, INITIAL ENCOUNTER: ICD-10-CM

## 2022-12-02 LAB
ATRIAL RATE: 81 BPM
P AXIS: 11 DEGREES
PR INTERVAL: 132 MS
QRS AXIS: 41 DEGREES
QRSD INTERVAL: 92 MS
QT INTERVAL: 354 MS
QTC INTERVAL: 411 MS
T WAVE AXIS: 28 DEGREES
VENTRICULAR RATE: 81 BPM

## 2022-12-12 NOTE — PRE-PROCEDURE INSTRUCTIONS
No outpatient medications have been marked as taking for the 12/14/22 encounter [facility] Encounter)  Instructed to avoid all ASA and OTC Vit/Supp 1 week prior to surgery and to avoid NSAIDs 3 days prior to surgery per anesthesia instructions  Tylenol ok to take prn  Pre procedure instructions given, verbalizes understanding  NPO after MN  Bathing reviewed

## 2022-12-14 ENCOUNTER — APPOINTMENT (OUTPATIENT)
Dept: RADIOLOGY | Facility: AMBULARY SURGERY CENTER | Age: 57
End: 2022-12-14

## 2022-12-14 ENCOUNTER — ANESTHESIA (OUTPATIENT)
Dept: PERIOP | Facility: AMBULARY SURGERY CENTER | Age: 57
End: 2022-12-14

## 2022-12-14 ENCOUNTER — HOSPITAL ENCOUNTER (OUTPATIENT)
Facility: AMBULARY SURGERY CENTER | Age: 57
Setting detail: OUTPATIENT SURGERY
Discharge: HOME | End: 2022-12-14
Attending: STUDENT IN AN ORGANIZED HEALTH CARE EDUCATION/TRAINING PROGRAM | Admitting: STUDENT IN AN ORGANIZED HEALTH CARE EDUCATION/TRAINING PROGRAM

## 2022-12-14 VITALS
HEART RATE: 77 BPM | DIASTOLIC BLOOD PRESSURE: 77 MMHG | SYSTOLIC BLOOD PRESSURE: 130 MMHG | RESPIRATION RATE: 16 BRPM | TEMPERATURE: 97.2 F | OXYGEN SATURATION: 96 %

## 2022-12-14 DIAGNOSIS — Z47.89 AFTERCARE FOLLOWING SURGERY OF THE MUSCULOSKELETAL SYSTEM: Primary | ICD-10-CM

## 2022-12-14 DEVICE — 30.0 MM, MICRO ACUTRAK 2® BONE SCREW
Type: IMPLANTABLE DEVICE | Site: FINGER | Status: FUNCTIONAL
Brand: ACUMED

## 2022-12-14 RX ORDER — CEFAZOLIN SODIUM 2 G/50ML
2000 SOLUTION INTRAVENOUS ONCE
Status: DISCONTINUED | OUTPATIENT
Start: 2022-12-14 | End: 2022-12-14

## 2022-12-14 RX ORDER — CEFAZOLIN SODIUM 2 G/50ML
2000 SOLUTION INTRAVENOUS ONCE
Status: DISCONTINUED | OUTPATIENT
Start: 2022-12-14 | End: 2022-12-14 | Stop reason: HOSPADM

## 2022-12-14 RX ORDER — CEFAZOLIN SODIUM 1 G/3ML
INJECTION, POWDER, FOR SOLUTION INTRAMUSCULAR; INTRAVENOUS AS NEEDED
Status: DISCONTINUED | OUTPATIENT
Start: 2022-12-14 | End: 2022-12-14

## 2022-12-14 RX ORDER — FENTANYL CITRATE/PF 50 MCG/ML
25 SYRINGE (ML) INJECTION
Status: COMPLETED | OUTPATIENT
Start: 2022-12-14 | End: 2022-12-14

## 2022-12-14 RX ORDER — CHLORHEXIDINE GLUCONATE 0.12 MG/ML
15 RINSE ORAL ONCE
Status: DISCONTINUED | OUTPATIENT
Start: 2022-12-14 | End: 2022-12-14 | Stop reason: HOSPADM

## 2022-12-14 RX ORDER — MIDAZOLAM HYDROCHLORIDE 2 MG/2ML
INJECTION, SOLUTION INTRAMUSCULAR; INTRAVENOUS AS NEEDED
Status: DISCONTINUED | OUTPATIENT
Start: 2022-12-14 | End: 2022-12-14

## 2022-12-14 RX ORDER — LIDOCAINE HYDROCHLORIDE 10 MG/ML
INJECTION, SOLUTION EPIDURAL; INFILTRATION; INTRACAUDAL; PERINEURAL AS NEEDED
Status: DISCONTINUED | OUTPATIENT
Start: 2022-12-14 | End: 2022-12-14

## 2022-12-14 RX ORDER — HYDROCODONE BITARTRATE AND ACETAMINOPHEN 5; 325 MG/1; MG/1
1 TABLET ORAL ONCE AS NEEDED
Status: DISCONTINUED | OUTPATIENT
Start: 2022-12-14 | End: 2022-12-14 | Stop reason: HOSPADM

## 2022-12-14 RX ORDER — ONDANSETRON 2 MG/ML
INJECTION INTRAMUSCULAR; INTRAVENOUS AS NEEDED
Status: DISCONTINUED | OUTPATIENT
Start: 2022-12-14 | End: 2022-12-14

## 2022-12-14 RX ORDER — MAGNESIUM HYDROXIDE 1200 MG/15ML
LIQUID ORAL AS NEEDED
Status: DISCONTINUED | OUTPATIENT
Start: 2022-12-14 | End: 2022-12-14 | Stop reason: HOSPADM

## 2022-12-14 RX ORDER — SODIUM CHLORIDE, SODIUM LACTATE, POTASSIUM CHLORIDE, CALCIUM CHLORIDE 600; 310; 30; 20 MG/100ML; MG/100ML; MG/100ML; MG/100ML
INJECTION, SOLUTION INTRAVENOUS CONTINUOUS PRN
Status: DISCONTINUED | OUTPATIENT
Start: 2022-12-14 | End: 2022-12-14

## 2022-12-14 RX ORDER — KETOROLAC TROMETHAMINE 30 MG/ML
15 INJECTION, SOLUTION INTRAMUSCULAR; INTRAVENOUS ONCE
Status: COMPLETED | OUTPATIENT
Start: 2022-12-14 | End: 2022-12-14

## 2022-12-14 RX ORDER — FENTANYL CITRATE 50 UG/ML
INJECTION, SOLUTION INTRAMUSCULAR; INTRAVENOUS AS NEEDED
Status: DISCONTINUED | OUTPATIENT
Start: 2022-12-14 | End: 2022-12-14

## 2022-12-14 RX ORDER — PROPOFOL 10 MG/ML
INJECTION, EMULSION INTRAVENOUS AS NEEDED
Status: DISCONTINUED | OUTPATIENT
Start: 2022-12-14 | End: 2022-12-14

## 2022-12-14 RX ORDER — ONDANSETRON 2 MG/ML
4 INJECTION INTRAMUSCULAR; INTRAVENOUS ONCE AS NEEDED
Status: DISCONTINUED | OUTPATIENT
Start: 2022-12-14 | End: 2022-12-14 | Stop reason: HOSPADM

## 2022-12-14 RX ORDER — HYDROMORPHONE HCL/PF 1 MG/ML
0.5 SYRINGE (ML) INJECTION
Status: DISCONTINUED | OUTPATIENT
Start: 2022-12-14 | End: 2022-12-14 | Stop reason: HOSPADM

## 2022-12-14 RX ORDER — PROPOFOL 10 MG/ML
INJECTION, EMULSION INTRAVENOUS CONTINUOUS PRN
Status: DISCONTINUED | OUTPATIENT
Start: 2022-12-14 | End: 2022-12-14

## 2022-12-14 RX ORDER — HYDROCODONE BITARTRATE AND ACETAMINOPHEN 5; 325 MG/1; MG/1
1 TABLET ORAL EVERY 6 HOURS PRN
Qty: 20 TABLET | Refills: 0 | Status: SHIPPED | OUTPATIENT
Start: 2022-12-14

## 2022-12-14 RX ADMIN — FENTANYL CITRATE 25 MCG: 50 INJECTION, SOLUTION INTRAMUSCULAR; INTRAVENOUS at 12:38

## 2022-12-14 RX ADMIN — SODIUM CHLORIDE, SODIUM LACTATE, POTASSIUM CHLORIDE, AND CALCIUM CHLORIDE: .6; .31; .03; .02 INJECTION, SOLUTION INTRAVENOUS at 12:00

## 2022-12-14 RX ADMIN — FENTANYL CITRATE 25 MCG: 50 INJECTION, SOLUTION INTRAMUSCULAR; INTRAVENOUS at 12:31

## 2022-12-14 RX ADMIN — FENTANYL CITRATE 25 MCG: 50 INJECTION INTRAMUSCULAR; INTRAVENOUS at 14:16

## 2022-12-14 RX ADMIN — FENTANYL CITRATE 25 MCG: 50 INJECTION, SOLUTION INTRAMUSCULAR; INTRAVENOUS at 12:17

## 2022-12-14 RX ADMIN — FENTANYL CITRATE 25 MCG: 50 INJECTION INTRAMUSCULAR; INTRAVENOUS at 14:12

## 2022-12-14 RX ADMIN — FENTANYL CITRATE 25 MCG: 50 INJECTION, SOLUTION INTRAMUSCULAR; INTRAVENOUS at 12:35

## 2022-12-14 RX ADMIN — FENTANYL CITRATE 25 MCG: 50 INJECTION, SOLUTION INTRAMUSCULAR; INTRAVENOUS at 13:19

## 2022-12-14 RX ADMIN — CEFAZOLIN SODIUM 2000 MG: 1 INJECTION, POWDER, FOR SOLUTION INTRAMUSCULAR; INTRAVENOUS at 12:18

## 2022-12-14 RX ADMIN — FENTANYL CITRATE 25 MCG: 50 INJECTION, SOLUTION INTRAMUSCULAR; INTRAVENOUS at 12:13

## 2022-12-14 RX ADMIN — MIDAZOLAM HYDROCHLORIDE 2 MG: 1 INJECTION, SOLUTION INTRAMUSCULAR; INTRAVENOUS at 12:13

## 2022-12-14 RX ADMIN — FENTANYL CITRATE 25 MCG: 50 INJECTION INTRAMUSCULAR; INTRAVENOUS at 14:03

## 2022-12-14 RX ADMIN — PROPOFOL 100 MCG/KG/MIN: 10 INJECTION, EMULSION INTRAVENOUS at 12:17

## 2022-12-14 RX ADMIN — LIDOCAINE HYDROCHLORIDE 50 MG: 10 INJECTION, SOLUTION EPIDURAL; INFILTRATION; INTRACAUDAL at 12:17

## 2022-12-14 RX ADMIN — ONDANSETRON 4 MG: 2 INJECTION INTRAMUSCULAR; INTRAVENOUS at 12:17

## 2022-12-14 RX ADMIN — FENTANYL CITRATE 25 MCG: 50 INJECTION INTRAMUSCULAR; INTRAVENOUS at 14:06

## 2022-12-14 RX ADMIN — FENTANYL CITRATE 25 MCG: 50 INJECTION, SOLUTION INTRAMUSCULAR; INTRAVENOUS at 13:27

## 2022-12-14 RX ADMIN — FENTANYL CITRATE 25 MCG: 50 INJECTION, SOLUTION INTRAMUSCULAR; INTRAVENOUS at 13:08

## 2022-12-14 RX ADMIN — PROPOFOL 40 MG: 10 INJECTION, EMULSION INTRAVENOUS at 12:17

## 2022-12-14 RX ADMIN — KETOROLAC TROMETHAMINE 15 MG: 30 INJECTION, SOLUTION INTRAMUSCULAR; INTRAVENOUS at 14:10

## 2022-12-14 RX ADMIN — HYDROMORPHONE HYDROCHLORIDE 0.5 MG: 1 INJECTION, SOLUTION INTRAMUSCULAR; INTRAVENOUS; SUBCUTANEOUS at 14:27

## 2022-12-14 RX ADMIN — FENTANYL CITRATE 25 MCG: 50 INJECTION, SOLUTION INTRAMUSCULAR; INTRAVENOUS at 13:02
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
## 2022-12-14 NOTE — INTERIM OP NOTE
ARTHRODESIS / FUSION JOINT FINGER - right thumb  Postoperative Note  PATIENT NAME: Raquel Solis  : 1965  MRN: [id number]  [facility] OR ROOM 04    Surgery Date: 2022    Preop Diagnosis:  Closed fracture subluxation of interphalangeal joint of right thumb with delayed healing, subsequent encounter [M10 164G]    Post-Op Diagnosis Codes:     * Closed fracture subluxation of interphalangeal joint of right thumb with delayed healing, subsequent encounter [H34 176G]    Procedure(s) (LRB):  ARTHRODESIS / FUSION JOINT FINGER - right thumb (Right)    Surgeon(s) and Role:     * Dinah Sánchez MD - Primary     * Cheli Garcia PA-C - Assisting    Specimens:  * No specimens in log *    Estimated Blood Loss:   Minimal    Anesthesia Type:   IV Sedation with Anesthesia     Findings:   Pt with significant cartilage damage in the IP joint 2/2 dislocation event    Pt's distal and proximal phalanx were well-aligned s/p fusion with use of compression screw    Complications:   None      SIGNATURE: Cheli Garcia PA-C   DATE: 2022   TIME: 1:59 PM

## 2022-12-14 NOTE — DISCHARGE INSTR - AVS FIRST PAGE
Post Operative Instructions    You have had surgery on your arm today, please read and follow the information below:  Elevate your hand above your elbow during the next 24-48 hours to help with swelling  Place your hand and arm over your head with motion at your shoulder three times a day  Do not apply any cream/ointment/oil to your incisions including antibiotics (I e Neosporin)  Do not use peroxide to clean your wound   Do not soak your hands in standing water (dishwater, tubs, Jacuzzi's, pools, etc ) until given permission (typically 2-3 weeks after injury)    Call the office if you notice any:  Increased numbness or tingling of your hand or fingers that is not relieved with elevation  Increasing pain that is not controlled with medication  Difficulty chewing, breathing, swallowing  Chest pains or shortness of breath  Fever over 101 4 degrees  Bandage: Do NOT remove bandage until follow-up appointment  Motion: Move fingers into a fist 5 times a day, DO NOT move any splinted fingers  Weight bearing status: The operated extremity should be non-weight bearing until further notice  Ice: Ice for 10 minutes every hour as needed for swelling x 24 hours  Sling: No sling necessary  Pain medication:   Norco/Hydrocodone one tab every 6 hours AS NEEDED for pain     Follow-up Appointment: 10-14 days  Please call the office if you have any questions or concerns regarding your post-operative care

## 2022-12-14 NOTE — ANESTHESIA PREPROCEDURE EVALUATION
Procedure:  ARTHRODESIS / FUSION JOINT FINGER - right thumb (Right: Finger)    Relevant Problems   No relevant active problems        Physical Exam    Airway    Mallampati score: III  TM Distance: >3 FB  Neck ROM: full     Dental   No notable dental hx     Cardiovascular  Cardiovascular exam normal    Pulmonary  Pulmonary exam normal     Other Findings        Anesthesia Plan  ASA Score- 2     Anesthesia Type- IV sedation with anesthesia with ASA Monitors  Additional Monitors:   Airway Plan:           Plan Factors-Exercise tolerance (METS): >4 METS  Chart reviewed  Patient summary reviewed  Patient is a current smoker  Patient did not smoke on day of surgery  Induction- intravenous  Postoperative Plan- Plan for postoperative opioid use  Informed Consent- Anesthetic plan and risks discussed with patient  I personally reviewed this patient with the CRNA  Discussed and agreed on the Anesthesia Plan with the CRNA  Freda Connors

## 2022-12-14 NOTE — ANESTHESIA POSTPROCEDURE EVALUATION
Post-Op Assessment Note    CV Status:  Stable    Pain management: adequate     Mental Status:  Alert and awake   Hydration Status:  Euvolemic   PONV Controlled:  Controlled   Airway Patency:  Patent      Post Op Vitals Reviewed: Yes      Staff: CRNA         No notable events documented      /62 (12/14/22 1400)    Temp 97 5 °F (36 4 °C) (12/14/22 1359)    Pulse 97 (12/14/22 1400)   Resp 16 (12/14/22 1400)    SpO2 95 % (12/14/22 1400)

## 2022-12-15 NOTE — OP NOTE
OPERATIVE REPORT  PATIENT NAME: Idalia Varner    :  1965  MRN: [id number]  Pt Location: [facility]    SURGERY DATE: 2022    Surgeon(s) and Role:     * Madai Lazo MD - Primary     * Raven Pillai PA-C - Assisting     Patient Assistant needed: Physician assistant was needed to assist with dissection, retraction, arthrodesis, closure, and splinting  No qualified resident was available  Preop Diagnosis:  Chronic dislocation of interphalangeal joint of right thumb with subsequent osteoarthritis    Post-Op Diagnosis Codes:  Chronic dislocation of interphalangeal joint of right thumb with subsequent osteoarthritis    Procedure(s) (LRB):  ARTHRODESIS / FUSION JOINT FINGER - right thumb interphalangeal joint    Specimen(s):  * No specimens in log *    Estimated Blood Loss:   Minimal    Drains:  * No LDAs found *    Anesthesia Type:   IV Sedation with Anesthesia    Operative Indications:  Chronic dislocation of interphalangeal joint of right thumb with subsequent osteoarthritis  Patient is a 72-year-old male who presents to clinic after dislocating his thumb IP joint on 2022  He was treated with splinting  He presented to clinic over 2 months after the injury  Radiographs were performed and he had christian radial dislocation/subluxation with evidence of osteoarthritis  We discussed treatment options with patient including closed management and operative management, namely IP fusion  Patient stated he was in significant pain and elected for operative management with IP fusion  We discussed risks of surgical management including pain, stiffness, infection, fracture, nonunion, malunion  Informed consent was obtained  Operative Findings:  Significant instability of right thumb IP joint with significant osteoarthritis over radial P1 condyle and middle of P2 base    Complications:   None    Procedure and Technique:  Patient was identified in the preoperative holding area    Surgical site was marked patient participation  Patient was taken to the operating theater  Anesthesia was induced  Formal timeout was performed confirming site, patient, procedure  All present were in agreement  10 cc of a 50-50 mixture of 0 25% Marcaine and 1% lidocaine was used for digital block  Extremity was prepped and draped in typical fashion  Arm tourniquet was inflated  H-type incision over the IP joint of the thumb was performed  Full-thickness skin flaps were elevated  The EPL tendon was transected transversely  The IP joint was entered  There was significant arthritis and full-thickness cartilage loss over the radial condyle of P1 and center of P2 base  There was significant stiffness and tightness of the radial collateral ligament that was sharply released  The IP joint was gently decorticated using combination of rongeur, bur, and curettes  All cartilage was removed and bone was taken back to subchondral or cancellous bone  A 0 035 guidewire was placed in antegrade fashion from the base of P2 to the tip of the finger  Radiographs were performed confirming appropriate center center alignment  The K wire was subsequently passed retrograde at the distal aspect of P1 and heading proximally  Radiographs were performed confirming good alignment  The drill was introduced over the K wire  A 26 mm AccuTrack 2 Micro screw was placed, however, this was found to be too short and was removed  A 30 mm AccuTrack 2 micro screw was then selected and placed over the guidewire  There was compression through the arthrodesis site was noted on radiographs  Final radiographs were performed and confirmed appropriate alignment of hardware and compression through the arthrodesis site  There was a 15 degree bend through the IP joint of the thumb  Stress radiographs were performed with flexion and extension of the IP joint and hardware was deemed to be stable  Wound was thoroughly irrigated    EPL tendon was repaired using 3-0 Vicryl suture  4-0 chromic suture in horizontal mattress closed the skin  Wound was dressed with Xeroform, 4 x 4, cast padding, thumb spica splint, and Ace bandage  Tourniquet was deflated  Patient was taken to PACU in stable condition  I was present for the entire procedure    Implant Name Type Inv  Item Serial No   Lot No  LRB No  Used Action   SCREW 30MM ACUTRAK 2 MICRO - [id number]  SCREW 30MM ACUTRAK 2 MICRO  [employer]  Right 1 Implanted   SCREW 26MM ACUTRAK 2 MICRO - [id number]  SCREW 26MM ACUTRAK 2 MICRO  [employer]  Right 1 Implanted and Explanted   WIRE ORTHO   035 X 6 IN SINGLE [place] - [id number]  WIRE ORTHO   035 X 6 IN SINGLE TROCAR  [employer]  Right 1 Implanted and Explanted     Patient Disposition:  PACU         SIGNATURE: Azael Badillo MD  DATE: December 14, 2022  TIME: 10:26 PM

## 2022-12-27 ENCOUNTER — OFFICE VISIT (OUTPATIENT)
Dept: OBGYN CLINIC | Facility: CLINIC | Age: 57
End: 2022-12-27

## 2022-12-27 ENCOUNTER — APPOINTMENT (OUTPATIENT)
Dept: RADIOLOGY | Facility: AMBULARY SURGERY CENTER | Age: 57
End: 2022-12-27
Attending: STUDENT IN AN ORGANIZED HEALTH CARE EDUCATION/TRAINING PROGRAM

## 2022-12-27 VITALS
WEIGHT: 232 LBS | HEART RATE: 74 BPM | BODY MASS INDEX: 34.36 KG/M2 | DIASTOLIC BLOOD PRESSURE: 84 MMHG | HEIGHT: 69 IN | SYSTOLIC BLOOD PRESSURE: 135 MMHG

## 2022-12-27 DIAGNOSIS — Z51.89 AFTERCARE: Primary | ICD-10-CM

## 2022-12-27 DIAGNOSIS — S62.501G: ICD-10-CM

## 2022-12-27 DIAGNOSIS — S63.124A CLOSED DISLOCATION OF INTERPHALANGEAL JOINT OF RIGHT THUMB, INITIAL ENCOUNTER: ICD-10-CM

## 2022-12-27 NOTE — PROGRESS NOTES
Assessment:   S/P ARTHRODESIS / FUSION JOINT FINGER - right thumb - Right on 12/14/2022    Plan:     Imaging reviewed today  Discussed his sutures are dissolving and body will absorb them  Patient placed in thumb spica cast for next 4 weeks  Avoid getting cast wet  Order for OT hand based thumb spica splint placed for after next visit   Cast off and imaging in 4 weeks     Follow Up:  4 weeks      CHIEF COMPLAINT:  Chief Complaint   Patient presents with   • Right Thumb - Post-op         SUBJECTIVE:  Yady Carpenter is a 56 y o  male who presents for follow up after ARTHRODESIS / [address] [facility] - right thumb - Right on 12/14/2022  Presents in post operative splint  Pain is well controlled  Sutures intact with no evidence of infection  Patient denies fever,chills  PHYSICAL EXAMINATION:  Vital signs: /84 (BP Location: Left arm, Patient Position: Sitting, Cuff Size: Adult)   Pulse 74   Ht 5' 9" (1 753 m)   Wt 105 kg (232 lb)   BMI 34 26 kg/m²   General: well developed and well nourished, alert, oriented times 3 and appears comfortable  Psychiatric: Normal    MUSCULOSKELETAL EXAMINATION:  Incision: Clean, dry, intact , dissolvable sutures intact with no evidence of infection or drainage  Range of Motion: not assessed given fusion surgery  Neurovascular status: Neuro intact, good cap refill  Activity Restrictions: immobilization of thumb for 6 weeks     STUDIES REVIEWED:  Images were reviewed in PACS by Dr Rosales Beaver  and demonstrate: xr right hand/thumb demonstrates stable hardware fixation s/p arthrodesis in acceptable alignment and position  PROCEDURES PERFORMED:  Cast application    Date/Time: 12/27/2022 10:01 AM  Performed by: Alison Jackson MD  Authorized by: Alison Jackson MD   Universal Protocol:  Consent: Verbal consent obtained    Risks and benefits: risks, benefits and alternatives were discussed  Consent given by: patient  Patient understanding: patient states understanding of the procedure being performed  Site marked: the operative site was marked  Patient identity confirmed: verbally with patient      Pre-procedure details:     Sensation:  Normal  Procedure details:     Laterality:  Right    Location:  Finger    Finger:  R thumb    Strapping: no  Cast type: hand based thumb spica     Supplies:  Cotton padding and fiberglass            Scribe Attestation    I,:  Carolina Hughes am acting as a scribe while in the presence of the attending physician :       I,:  Alison Jackson MD personally performed the services described in this documentation    as scribed in my presence :

## 2023-01-24 ENCOUNTER — APPOINTMENT (OUTPATIENT)
Dept: RADIOLOGY | Facility: AMBULARY SURGERY CENTER | Age: 58
End: 2023-01-24
Attending: STUDENT IN AN ORGANIZED HEALTH CARE EDUCATION/TRAINING PROGRAM

## 2023-01-24 ENCOUNTER — OFFICE VISIT (OUTPATIENT)
Dept: OBGYN CLINIC | Facility: CLINIC | Age: 58
End: 2023-01-24

## 2023-01-24 ENCOUNTER — OFFICE VISIT (OUTPATIENT)
Dept: OCCUPATIONAL THERAPY | Facility: CLINIC | Age: 58
End: 2023-01-24

## 2023-01-24 VITALS
SYSTOLIC BLOOD PRESSURE: 113 MMHG | WEIGHT: 231.2 LBS | HEIGHT: 69 IN | DIASTOLIC BLOOD PRESSURE: 78 MMHG | BODY MASS INDEX: 34.24 KG/M2 | HEART RATE: 82 BPM

## 2023-01-24 DIAGNOSIS — S62.501G: Primary | ICD-10-CM

## 2023-01-24 DIAGNOSIS — S62.501G: ICD-10-CM

## 2023-01-24 NOTE — PROGRESS NOTES
Orthosis    Diagnosis:   1  Closed fracture subluxation of interphalangeal joint of right thumb with delayed healing, subsequent encounter  Ambulatory Referral to PT/OT Hand Therapy        Indication: Motion Blocking    Location: Right  hand and thumb  Supplies: Custom Fit Orthotic  Orthosis type: Hand-Based SPICA  Wearing Schedule: Remove for hygiene only  Describe Position: Extended to IP for fusion  Precautions: Universal (skin contact/breakdown)    Patient or Caregiver expresses understanding of wearing Schedule and Precautions? Yes  Patient or Caregiver able to don/doff orthotic independently? Yes    Written orders provided to patient?  Yes  Orders Obtained: Written  Orders Obtained from: Steven Paz    Return for evaluation and treatment No

## 2023-01-24 NOTE — PROGRESS NOTES
Assessment/Plan:  Patient ID: Keara Hidalgo 56 y o  male   Surgery: ARTHRODESIS / [address] [facility] - right thumb - Right  Date of Surgery: 12/14/2022    The patient is doing well postoperatively  X-rays were reviewed in the office today  We did discuss there is not much fusion at the arthrodesis site  The patient will have custom thumb spica brace fabricated at OT today he was advised to wear this full time like a cast but may remove to wash his hands  Follow Up:  6 weeks    To Do Next Visit:  X-rays of the  right  thumb      CHIEF COMPLAINT:  Chief Complaint   Patient presents with   • Right Thumb - Post-op         SUBJECTIVE:  Keara Hidalgo is a 56y o  year old male who presents for follow up 6 weeks s/p ARTHRODESIS / [address] [facility] - right thumb - Right  Today patient states he is doing well  He has been tolerating the cast well  He denies any pain at the IP joint  He does note pain at the MCP joint  PHYSICAL EXAMINATION:  General: well developed and well nourished, alert, oriented times 3 and appears comfortable  Psychiatric: Normal    MUSCULOSKELETAL EXAMINATION:  Incision: Clean, dry, intact  Surgery Site: normal, no evidence of infection   Range of Motion: As expected  Neurovascular status: Neuro intact, good cap refill  Done today: Sutures out      STUDIES REVIEWED:  Images of the right thumb were reviewed in PACS by Dr Salvador Blackwood and demonstrate  s/p IP joint fusion with stable orthopedic hardware  Small amount of bridging noted dorsally, however, arthrodesis site clearly visible        PROCEDURES PERFORMED:  Procedures  No Procedures performed today    Scribe Attestation    I,:  Otilia Patterson MA am acting as a scribe while in the presence of the attending physician :       I,:  Aleksander Becerra MD personally performed the services described in this documentation    as scribed in my presence :

## 2023-03-13 ENCOUNTER — TELEPHONE (OUTPATIENT)
Dept: OBGYN CLINIC | Facility: CLINIC | Age: 58
End: 2023-03-13

## 2023-03-13 NOTE — TELEPHONE ENCOUNTER
LVM stating provider requested to see him and offered him an appointment this Friday 03/17 at 2:45 and asked him to call back with whether or not he could make it

## 2023-05-22 ENCOUNTER — TELEPHONE (OUTPATIENT)
Dept: OBGYN CLINIC | Facility: CLINIC | Age: 58
End: 2023-05-22

## 2023-05-22 NOTE — TELEPHONE ENCOUNTER
LVM for patient requesting him to give us a call back and make an appointment per Dr Serafin Walter

## 2024-01-01 NOTE — PROGRESS NOTES
Admission History and Physical      ADMISSION DATE:  2024  ATTENDING PHYSICIAN:  Rebecca Holden DO     Girl Greer Joseph Rachelle named \"Yuliana\" is a 1 day old female 8 lb 6.8 oz (3820 g) infant, delivered via Vaginal, Spontaneous [250] at Gestational Age: 39w6d on 2024 at 10:48 AM.    Pregnancy complicated by no complication  NICU was not called to delivery.  Since delivery mother and baby are doing well.    MATERNAL INFORMATION:     Age, /Para, MAIN:   Information for the patient's mother:  EdiGreer norton [[id number]]   40 year old          2024, by Last Menstrual Period   Mom      steroids during pregnancy: None    Information for the patient's mother:  Greer Bush [[id number]]     Social History     Tobacco Use    Smoking status: Never    Smokeless tobacco: Never   Substance Use Topics    Alcohol use: Not Currently     Maternal drug use: never    Information for the patient's mother:  Greer Bush [[id number]]     Past Medical History:   Diagnosis Date    Anxiety        Family history: denies history of congenital heart disease, jaundice requiring phototherapy, or developmental dysplasia of the hip  Social history: baby will be living at home with Mom, Dad and 2 siblings, parents do have crib and car seat, no smokers in the house     Prenatal Labs:  Information for the patient's mother:  RachelleGreer [[id number]]     Recent Labs   Lab 24  2332 07/15/24  1155 24  1627   HIV Antigen/ Antibody Combo Screen  --  Nonreactive Nonreactive   RPR Nonreactive Nonreactive Nonreactive   Neisseria gonorrhoeae by Nucleic Acid Amplification  --   --  Negative   Chlamydia trachomatis by Nucleic Acid Amplification  --   --  Negative   Rubella Antibody, IgG  --   --  106.6       GBS: Negative GBS negative. Confirmed per mother's chart review.    Maternal blood type:   Information for the patient's mother:  Greer Bush [[id number]]   O  ORTHOPAEDIC HAND, WRIST, AND ELBOW OFFICE  VISIT       ASSESSMENT/PLAN:      Diagnoses and all orders for this visit:    Closed dislocation of interphalangeal joint of right thumb, initial encounter  -     Ambulatory Referral to Hand Surgery  -     Case request operating room: ARTHRODESIS / [address] [place] [address] - right thumb; Standing  -     Case request operating room: ARTHRODESIS / [address] [place] [address] - right thumb  -     EKG 12 lead; Future    Other orders  -     Diet NPO; Sips with meds; Standing  -     Nursing Communication [address] [place] [address] Interventions Implemented; Standing  -     Nursing Communication G bath, have staff wash entire body (neck down) per pre-op bathing protocol  Routine, evening prior to, and day of surgery ; Standing  -     Nursing Communication Swab both nares with Povidone-Iodine solution, EXCLUDE if patient has shellfish/Iodine allergy  Routine, day of surgery, on call to OR; Standing  -     chlorhexidine (PERIDEX) 0 12 % oral rinse 15 mL  -     Void on call to OR; Standing  -     Insert peripheral IV; Standing  -     ceFAZolin (ANCEF) 2,000 mg in dextrose 5 % 100 mL IVPB  -     Diet NPO; Sips with meds; Standing  -     Nursing Communication [address] [place] [address] Interventions Implemented; Standing  -     Nursing Communication G bath, have staff wash entire body (neck down) per pre-op bathing protocol  Routine, evening prior to, and day of surgery ; Standing  -     Nursing Communication Swab both nares with Povidone-Iodine solution, EXCLUDE if patient has shellfish/Iodine allergy  Routine, day of surgery, on call to OR; Standing  -     chlorhexidine (PERIDEX) 0 12 % oral rinse 15 mL  -     Void on call to OR; Standing  -     Insert peripheral IV; Standing  -     ceFAZolin (ANCEF) 2,000 mg in dextrose 5 % 100 mL IVPB      68-year-old male with right thumb IP joint dislocation  X-rays and MRI were reviewed  Continued non operative versus surgical intervention was discussed   The patient elected to proceed with Rh Positive, Antibody screen: Negative  Baby blood type: A Rh Positive, JAVIER Negative      BIRTH HISTORY:     Delivery Method: Vaginal, Spontaneous [250]   Rupture date & time: 2024 7:46 AM   Date & time of birth 2024 10:48 AM   Induction: Misoprostol Post-term Gestation   Complications/ Risks None     Placenta appearance: Intact   Cord info/complications: 3 Vessels Nuchal Nuchal Cord:Loose 1   Delayed cord clamping: Yes   Indications for :     Presentation/position: Vertex         Forceps attempted? No     Vacuum attempted? No     Shoulder dystocia? No                            INFORMATION     Resuscitation:  Bulb Syringe;Tactile Stimulation          APGARS  One minute Five minutes   Skin color: 0  1    Heart rate: 2  2     Reflex: 2  2    Muscle tone: 2  2    Breathin  2    Totals:   8  9      Cord Blood/Pray Evaluation (CRD)  No results found  Blood gases sent? No   Cord pH No results found    Early onset sepsis screen:     Sepsis Risk Calculator Data      Flowsheet Row Admission (Current) from 2024 in [facility] 11T NURSERY   Gestational age (weeks) 39 weeks   Gestational age (days) 6 days   Highest maternal antepartum temp (F) 99   ROM (hours) 3 hours   Maternal GBS status 2   Type of intrapartum antibiotics 0            Risk at Birth: 0.13  Risk - Well Appearin.05  Risk - Equivocal: 0.64  Risk - Clinical Illness: 2.71    Clinical Exam:  Well Appearing (no persistent physiologic abnormalities)    Plan for EOS  - EOS Risk at Birth: Less than 1 per 1,000 live births and well appearing - No culture, No antibiotics, Routine Vitals  - Blood culture and CBC on admission - No and N/A  - No Antibiotics was initiated due to low risk of Sepsis.      PHYSICAL EXAM     VITALS: Visit Vitals  Pulse 130   Temp 98.1 °F (36.7 °C) (Axillary)   Resp 40   Ht 19.5\" (49.5 cm) Comment: Filed from Delivery Summary   Wt 3.71 kg (8 lb 2.9 oz)   HC 36 cm (14.17\") Comment: Filed from  surgical intervention in the form of right thumb IP joint fusion  Risks of the surgery are inclusive of but not limited to bleeding, infection, nerve injury, blood clot, worsening of symptoms, not achieving the anticipated results, persistent stiffness, weakness and the need for additional surgery  The patient verbally stated they understood those risks and would like to proceed with the surgery  Surgical consent was signed in the office today  The patient verbalized understanding of exam findings and treatment plan  We engaged in the shared decision-making process and treatment options were discussed at length with the patient  Surgical and conservative management discussed today along with risks and benefits  Follow Up: After surgery       To Do Next Visit:  X-rays of the  right  thumb and Cast/splint off prior to x-ray      Operative Discussions:  Standard Consent: The risks and benefits of the procedure were explained to the patient, which include, but are not limited to: Bleeding, infection, recurrence, pain, scar, damage to tendons, damage to nerves, and damage to blood vessels, failure to give desired results and complications related to anesthesia  These risks, along with alternative conservative treatment options, and postoperative protocols were voiced back and understood by the patient  All questions were answered to the patient's satisfaction  The patient agrees to comply with a standard postoperative protocol, and is willing to proceed  Education was provided via written and auditory forms  There were no barriers to learning  Written handouts regarding wound care, incision and scar care, and general preoperative information was provided to the patient  Prior to surgery, the patient may be requested to stop all anti-inflammatory medications  Prophylactic aspirin, Plavix, and Coumadin may be allowed to be continued    Medications including vitamin E , ginkgo, and fish oil are requested Delivery Summary   BMI 15.12 kg/m²     Intake/Output          0700   0659  0700   0659          Unmeasured Urine Occurrence 2 x     Unmeasured Stool Occurrence 1 x             Birth Measurements:        Weight: 8 lb 6.8 oz (3820 g)        Length: 19.5\"        Head circumference: 36 cm    GENERAL:Baby Girl is an alert, vigorous female with appropriate behavior. She is in no acute distress.  SKIN: Her skin is warm with normal turgor. The color of the skin is pink. There is no rash. There are no bruises or other signs of injury.  Significant jaundice is not present.  HEAD: The anterior fontanel is open and flat.  The head is atraumatic and normocephalic.   EYES: The conjunctivae appear normal with neither icterus nor subconjunctival hemorrhage.  EARS: Pinnae normal.  NOSE: There is no nasal flaring, nares patent bilaterally.  THROAT:  The oropharynx is normal.  There is no cleft of the palate.  NECK: Clavicles without crepitus.  TRUNK AND THORAX: There are no lesions on the trunk; there is no dimple over the presacral area. There are no retractions.  LUNGS: The lung fields are clear to auscultation.No grunting. No retractions.  HEART: The precordium is quiet. The heart rhythm is grossly regular. S1 and S2 are normal. There are no murmurs. Normal femoral pulses.  ABDOMEN: The umbilical cord stump is normal. There is not an umbilical hernia. The abdomen is soft, non-distended with normoactive bowel sounds. No masses.   GENITALIA: normal female genitalia  RECTAL: Anus patent  EXTREMITIES: Moving all 4 extremities. The hip exam is normal  . There are no hip clicks or clunks.    NEUROLOGIC: She displays normal tone throughout. She is not jittery. Normal suck reflex. Normal and symmetric kushal, palmar/plantar grasp reflexes.     ASSESSMENT   There is no problem list on file for this patient.      Well 1 day old AGA female infant born at Gestational Age: 39w6d via Vaginal, Spontaneous [250] to a 41yo   to be stopped approximately one week prior to surgery  Hypertensive medications and beta blockers, if taken, should be continued  Kay Lundy MD  Attending, Orthopaedic Surgery  Hand, Wrist, and Elbow Surgery  [facility]    ____________________________________________________________________________________________________________________________________________      CHIEF COMPLAINT:  Chief Complaint   Patient presents with   • Right Thumb - Pain, Fracture       SUBJECTIVE:  Simon Guerrero is a 49y o  year old RHD male who presents today as a referral from Dr Nabil Still for evaluation of his right thumb  Patient states on 8/25/22 he tripped and fel over a [employer] ski trailer  The patient states his finger dislocated and he relocated it himself  He presented to the ED after the injury where x-rays were taken and the patient was diagnosed with a proximal phalanx fracture  The patient was evaluated by Dr Linda Pascal on 8/29/22 and was treated with splinting  The patient notes continued pain about his thumb  He also notes his finger is crooked  The patient is retired  I have personally reviewed all the relevant PMH, PSH, SH, FH, Medications and allergies      PAST MEDICAL HISTORY:  History reviewed  No pertinent past medical history  PAST SURGICAL HISTORY:  History reviewed  No pertinent surgical history  FAMILY HISTORY:  History reviewed  No pertinent family history      SOCIAL HISTORY:  Social History     Tobacco Use   • Smoking status: Current Every Day Smoker     Packs/day: 0 20     Types: Cigarettes   • Smokeless tobacco: Never Used   Vaping Use   • Vaping Use: Never used   Substance Use Topics   • Alcohol use: Never   • Drug use: Never       MEDICATIONS:    Current Outpatient Medications:   •  cyclobenzaprine (FLEXERIL) 10 mg tablet, Take 1 tablet (10 mg total) by mouth 3 (three) times a day as needed for muscle spasms for up to 30 doses (Patient not taking: Reported on 11/1/2022), Disp: 30 tablet, Rfl: 0  •  naproxen (NAPROSYN) 375 mg tablet, Take 1 tablet (375 mg total) by mouth 2 (two) times a day with meals for 60 doses, Disp: 60 tablet, Rfl: 0  •  oxyCODONE-acetaminophen (PERCOCET) 5-325 mg per tablet, Take 1 tablet by mouth every 6 (six) hours as needed for severe pain for up to 20 doses Max Daily Amount: 4 tablets (Patient not taking: Reported on 11/1/2022), Disp: 20 tablet, Rfl: 0    ALLERGIES:  No Known Allergies        REVIEW OF SYSTEMS:  Musculoskeletal:        As noted in HPI  All other systems reviewed and are negative  VITALS:  Vitals:    11/01/22 0913   BP: 120/77   Pulse: 78       LABS:  HgA1c: No results found for: HGBA1C  BMP: No results found for: GLUCOSE, CALCIUM, NA, K, CO2, CL, BUN, CREATININE    _____________________________________________________  PHYSICAL EXAMINATION:  General: well developed and well nourished, alert, oriented times 3 and appears comfortable  Psychiatric: Normal  HEENT: Normocephalic, Atraumatic Trachea Midline, No torticollis  Pulmonary: No audible wheezing or respiratory distress   Abdomen/GI: Non tender, non distended   Cardiovascular: No pitting edema, 2+ radial pulse   Skin: No masses, erythema, lacerations, fluctation, ulcerations  Neurovascular: Sensation Intact to the Median, Ulnar, Radial Nerve, Motor Intact to the Median, Ulnar, Radial Nerve and Pulses Intact  Musculoskeletal: Normal, except as noted in detailed exam and in HPI  MUSCULOSKELETAL EXAMINATION:  Right thumb  Radial deviation of distal phalanx  Swelling at IP joint  Compartments soft  Brisk capillary refill  S/m intact median, radial, and ulnar nerve   ___________________________________________________  STUDIES REVIEWED:  Images of the right hand were reviewed in PACS by Dr Darryl Waterman and demonstrate  subluxation right thumb IP joint           PROCEDURES PERFORMED:  Procedures  No Procedures performed mother who was GBS negative with normal PNLs. Baby is clinically doing well.    PLAN     Routine  care and anticipatory guidance discussed.  Car seat safety discussed.   Continue to breast feed ad darshan based on feeding cues, not to exceeed 4 hours between feedings. Goal 8-12 feeds per day. Lactation consultation as needed.  Jackson screen, critical congenital heart disease screening, and hearing screening to be completed prior to discharge.  Bilirubin: TSB to be checked at 24 hours of life. Bilirubin level is not yet determined. Monitor for clinically significant jaundice. TcB/TSB as appropriate.  Hepatitis B vaccine given.  Safe sleep discussion started with parents.  Parents have crib/bassinet at home.     Discharge Planning: Anticipate follow-up with Essence Quan MD in 1-3 days following discharge.     plan of care discussed in detail with family and team, all questions answered.  This encounter was completed with the aid of audio/video : [facility]  Medical Student - M3  2024 8:10 AM      today    _____________________________________________________      [facility]    I,:  Otilia Patterson MA am acting as a scribe while in the presence of the attending physician :       I,:  Kwaku Doherty MD personally performed the services described in this documentation    as scribed in my presence :

## 2024-10-05 ENCOUNTER — APPOINTMENT (EMERGENCY)
Dept: RADIOLOGY | Facility: HOSPITAL | Age: 59
End: 2024-10-05
Payer: MEDICARE

## 2024-10-05 ENCOUNTER — HOSPITAL ENCOUNTER (EMERGENCY)
Facility: HOSPITAL | Age: 59
Discharge: HOME | End: 2024-10-06
Attending: EMERGENCY MEDICINE | Admitting: EMERGENCY MEDICINE
Payer: MEDICARE

## 2024-10-05 ENCOUNTER — APPOINTMENT (EMERGENCY)
Dept: CT IMAGING | Facility: HOSPITAL | Age: 59
End: 2024-10-05
Payer: MEDICARE

## 2024-10-05 DIAGNOSIS — S22.39XA RIB FRACTURE: Primary | ICD-10-CM

## 2024-10-05 LAB
ABO GROUP BLD: NORMAL
ALBUMIN SERPL BCG-MCNC: 4.3 G/DL (ref 3.5–5)
ALP SERPL-CCNC: 56 U/L (ref 34–104)
ALT SERPL W P-5'-P-CCNC: 26 U/L (ref 7–52)
ANION GAP SERPL CALCULATED.3IONS-SCNC: 7 MMOL/L (ref 4–13)
APTT PPP: 27 SECONDS (ref 23–34)
AST SERPL W P-5'-P-CCNC: 16 U/L (ref 13–39)
BASOPHILS # BLD AUTO: 0.15 THOUSANDS/ΜL (ref 0–0.1)
BASOPHILS NFR BLD AUTO: 1 % (ref 0–1)
BILIRUB SERPL-MCNC: 0.33 MG/DL (ref 0.2–1)
BLD GP AB SCN SERPL QL: NEGATIVE
BUN SERPL-MCNC: 12 MG/DL (ref 5–25)
CALCIUM SERPL-MCNC: 9.9 MG/DL (ref 8.4–10.2)
CARDIAC TROPONIN I PNL SERPL HS: 8 NG/L
CHLORIDE SERPL-SCNC: 107 MMOL/L (ref 96–108)
CO2 SERPL-SCNC: 23 MMOL/L (ref 21–32)
CREAT SERPL-MCNC: 0.91 MG/DL (ref 0.6–1.3)
EOSINOPHIL # BLD AUTO: 0.63 THOUSAND/ΜL (ref 0–0.61)
EOSINOPHIL NFR BLD AUTO: 6 % (ref 0–6)
ERYTHROCYTE [DISTWIDTH] IN BLOOD BY AUTOMATED COUNT: 13.6 % (ref 11.6–15.1)
GFR SERPL CREATININE-BSD FRML MDRD: 93 ML/MIN/1.73SQ M
GLUCOSE SERPL-MCNC: 99 MG/DL (ref 65–140)
HCT VFR BLD AUTO: 45.4 % (ref 36.5–49.3)
HGB BLD-MCNC: 15.1 G/DL (ref 12–17)
IMM GRANULOCYTES # BLD AUTO: 0.11 THOUSAND/UL (ref 0–0.2)
IMM GRANULOCYTES NFR BLD AUTO: 1 % (ref 0–2)
INR PPP: 0.9 (ref 0.85–1.19)
LYMPHOCYTES # BLD AUTO: 3.38 THOUSANDS/ΜL (ref 0.6–4.47)
LYMPHOCYTES NFR BLD AUTO: 32 % (ref 14–44)
MCH RBC QN AUTO: 32.5 PG (ref 26.8–34.3)
MCHC RBC AUTO-ENTMCNC: 33.3 G/DL (ref 31.4–37.4)
MCV RBC AUTO: 98 FL (ref 82–98)
MONOCYTES # BLD AUTO: 1.17 THOUSAND/ΜL (ref 0.17–1.22)
MONOCYTES NFR BLD AUTO: 11 % (ref 4–12)
NEUTROPHILS # BLD AUTO: 5.12 THOUSANDS/ΜL (ref 1.85–7.62)
NEUTS SEG NFR BLD AUTO: 49 % (ref 43–75)
NRBC BLD AUTO-RTO: 0 /100 WBCS
PLATELET # BLD AUTO: 243 THOUSANDS/UL (ref 149–390)
PMV BLD AUTO: 9.2 FL (ref 8.9–12.7)
POTASSIUM SERPL-SCNC: 4.1 MMOL/L (ref 3.5–5.3)
PROT SERPL-MCNC: 7.3 G/DL (ref 6.4–8.4)
PROTHROMBIN TIME: 12.9 SECONDS (ref 12.3–15)
RBC # BLD AUTO: 4.65 MILLION/UL (ref 3.88–5.62)
RH BLD: POSITIVE
SODIUM SERPL-SCNC: 137 MMOL/L (ref 135–147)
SPECIMEN EXPIRATION DATE: NORMAL
WBC # BLD AUTO: 10.56 THOUSAND/UL (ref 4.31–10.16)

## 2024-10-05 PROCEDURE — 96374 THER/PROPH/DIAG INJ IV PUSH: CPT

## 2024-10-05 PROCEDURE — 93005 ELECTROCARDIOGRAM TRACING: CPT

## 2024-10-05 PROCEDURE — 85730 THROMBOPLASTIN TIME PARTIAL: CPT | Performed by: EMERGENCY MEDICINE

## 2024-10-05 PROCEDURE — 85025 COMPLETE CBC W/AUTO DIFF WBC: CPT | Performed by: EMERGENCY MEDICINE

## 2024-10-05 PROCEDURE — 74177 CT ABD & PELVIS W/CONTRAST: CPT

## 2024-10-05 PROCEDURE — 71045 X-RAY EXAM CHEST 1 VIEW: CPT

## 2024-10-05 PROCEDURE — 86850 RBC ANTIBODY SCREEN: CPT | Performed by: EMERGENCY MEDICINE

## 2024-10-05 PROCEDURE — 86901 BLOOD TYPING SEROLOGIC RH(D): CPT | Performed by: EMERGENCY MEDICINE

## 2024-10-05 PROCEDURE — 86900 BLOOD TYPING SEROLOGIC ABO: CPT | Performed by: EMERGENCY MEDICINE

## 2024-10-05 PROCEDURE — 99285 EMERGENCY DEPT VISIT HI MDM: CPT | Performed by: EMERGENCY MEDICINE

## 2024-10-05 PROCEDURE — 85610 PROTHROMBIN TIME: CPT | Performed by: EMERGENCY MEDICINE

## 2024-10-05 PROCEDURE — 96361 HYDRATE IV INFUSION ADD-ON: CPT

## 2024-10-05 PROCEDURE — 71260 CT THORAX DX C+: CPT

## 2024-10-05 PROCEDURE — 96375 TX/PRO/DX INJ NEW DRUG ADDON: CPT

## 2024-10-05 PROCEDURE — 99284 EMERGENCY DEPT VISIT MOD MDM: CPT

## 2024-10-05 PROCEDURE — 84484 ASSAY OF TROPONIN QUANT: CPT | Performed by: EMERGENCY MEDICINE

## 2024-10-05 PROCEDURE — 80053 COMPREHEN METABOLIC PANEL: CPT | Performed by: EMERGENCY MEDICINE

## 2024-10-05 PROCEDURE — 36415 COLL VENOUS BLD VENIPUNCTURE: CPT | Performed by: EMERGENCY MEDICINE

## 2024-10-05 RX ORDER — MORPHINE SULFATE 10 MG/ML
6 INJECTION, SOLUTION INTRAMUSCULAR; INTRAVENOUS ONCE
Status: COMPLETED | OUTPATIENT
Start: 2024-10-05 | End: 2024-10-05

## 2024-10-05 RX ORDER — ACETAMINOPHEN 10 MG/ML
1000 INJECTION, SOLUTION INTRAVENOUS ONCE
Status: COMPLETED | OUTPATIENT
Start: 2024-10-05 | End: 2024-10-05

## 2024-10-05 RX ADMIN — IOHEXOL 100 ML: 350 INJECTION, SOLUTION INTRAVENOUS at 22:53

## 2024-10-05 RX ADMIN — ACETAMINOPHEN 1000 MG: 1000 INJECTION, SOLUTION INTRAVENOUS at 22:10

## 2024-10-05 RX ADMIN — SODIUM CHLORIDE 1000 ML: 0.9 INJECTION, SOLUTION INTRAVENOUS at 22:11

## 2024-10-05 RX ADMIN — MORPHINE SULFATE 6 MG: 10 INJECTION INTRAVENOUS at 22:10

## 2024-10-06 VITALS
OXYGEN SATURATION: 98 % | RESPIRATION RATE: 15 BRPM | HEART RATE: 55 BPM | DIASTOLIC BLOOD PRESSURE: 70 MMHG | TEMPERATURE: 97.3 F | SYSTOLIC BLOOD PRESSURE: 114 MMHG | BODY MASS INDEX: 35.76 KG/M2 | HEIGHT: 69 IN | WEIGHT: 241.4 LBS

## 2024-10-06 LAB
2HR DELTA HS TROPONIN: 0 NG/L
ATRIAL RATE: 76 BPM
CARDIAC TROPONIN I PNL SERPL HS: 8 NG/L
P AXIS: 22 DEGREES
PR INTERVAL: 136 MS
QRS AXIS: 118 DEGREES
QRSD INTERVAL: 86 MS
QT INTERVAL: 336 MS
QTC INTERVAL: 378 MS
T WAVE AXIS: -6 DEGREES
VENTRICULAR RATE: 76 BPM

## 2024-10-06 PROCEDURE — 96372 THER/PROPH/DIAG INJ SC/IM: CPT

## 2024-10-06 PROCEDURE — 93010 ELECTROCARDIOGRAM REPORT: CPT | Performed by: INTERNAL MEDICINE

## 2024-10-06 PROCEDURE — 36415 COLL VENOUS BLD VENIPUNCTURE: CPT | Performed by: EMERGENCY MEDICINE

## 2024-10-06 PROCEDURE — 84484 ASSAY OF TROPONIN QUANT: CPT | Performed by: EMERGENCY MEDICINE

## 2024-10-06 RX ORDER — KETOROLAC TROMETHAMINE 30 MG/ML
15 INJECTION, SOLUTION INTRAMUSCULAR; INTRAVENOUS ONCE
Status: COMPLETED | OUTPATIENT
Start: 2024-10-06 | End: 2024-10-06

## 2024-10-06 RX ORDER — LIDOCAINE 50 MG/G
1 PATCH TOPICAL DAILY
Qty: 30 PATCH | Refills: 0 | Status: SHIPPED | OUTPATIENT
Start: 2024-10-06

## 2024-10-06 RX ORDER — OXYCODONE HYDROCHLORIDE 5 MG/1
5 TABLET ORAL ONCE
Status: COMPLETED | OUTPATIENT
Start: 2024-10-06 | End: 2024-10-06

## 2024-10-06 RX ORDER — MORPHINE SULFATE 15 MG/1
15 TABLET ORAL EVERY 6 HOURS PRN
Qty: 20 TABLET | Refills: 0 | Status: SHIPPED | OUTPATIENT
Start: 2024-10-06

## 2024-10-06 RX ADMIN — KETOROLAC TROMETHAMINE 15 MG: 30 INJECTION, SOLUTION INTRAMUSCULAR at 00:54

## 2024-10-06 RX ADMIN — OXYCODONE HYDROCHLORIDE 5 MG: 5 TABLET ORAL at 00:53

## 2024-10-06 NOTE — ED PROVIDER NOTES
Final diagnoses:   None     ED Disposition       None          Assessment & Plan       Medical Decision Making  Patient is a 58-year-old male no past medical history presenting with right-sided chest pain following fall.  Patient is well-appearing at bedside with stable vitals and in no acute distress.  He has equal lung sounds bilaterally, no signs of trauma but with tenderness to the right chest, no gross amount is on neurologic exam and no other significant physical exam findings.  Obtain CT chest abdomen pelvis to assess for traumatic injuries, give pain control and reassess.    Amount and/or Complexity of Data Reviewed  Labs: ordered.  Radiology: ordered.    Risk  Prescription drug management.        ED Course as of 10/06/24 0409   Sat Oct 05, 2024   2349 To right-sided nondisplaced rib fractures, if delta troponin unremarkable will discharge with pain control, incentive spirometry, outpatient follow-up   Sun Oct 06, 2024   0038 Patient has incentive spirometer.       Medications   acetaminophen (Ofirmev) injection 1,000 mg (has no administration in time range)   morphine injection 6 mg (has no administration in time range)   sodium chloride 0.9 % bolus 1,000 mL (has no administration in time range)       ED Risk Strat Scores                           SBIRT 20yo+      Flowsheet Row Most Recent Value   Initial Alcohol Screen: US AUDIT-C     1. How often do you have a drink containing alcohol? 0 Filed at: 10/05/2024 2143   2. How many drinks containing alcohol do you have on a typical day you are drinking?  0 Filed at: 10/05/2024 2143   3a. Male UNDER 65: How often do you have five or more drinks on one occasion? 0 Filed at: 10/05/2024 2143   Audit-C Score 0 Filed at: 10/05/2024 2143   NARDA: How many times in the past year have you...    Used an illegal drug or used a prescription medication for non-medical reasons? Never Filed at: 10/05/2024 2143                            History of Present Illness       Chief  Complaint   Patient presents with   • Fall     Patient arrived to ER c/o fall happened 4-5 days ago. Patient fell from a ladder, 15 ft. Patient fell on top of the ladder and the ladder was on dirt. Denies Head strike Denies LOC Denies BT +SOB + CP   complaining of rt sided rib cage pain and rt sided body pain. Patient states he vomited blood 2 days ago.        History reviewed. No pertinent past medical history.   Past Surgical History:   Procedure Laterality Date   • HAND SURGERY Right    • HERNIA REPAIR     • [place] ARTHRODESIS INTERPHALANGEAL JT W/WO INT FIXJ Right 12/14/2022    Procedure: ARTHRODESIS / FUSION JOINT FINGER - right thumb;  Surgeon: Kaden Adames MD;  Location: [facility] MAIN OR;  Service: Orthopedics      History reviewed. No pertinent family history.   Social History     Tobacco Use   • Smoking status: Every Day     Current packs/day: 0.20     Types: Cigarettes   • Smokeless tobacco: Never   • Tobacco comments:     Smokes one pack a week   Vaping Use   • Vaping status: Never Used   Substance Use Topics   • Alcohol use: Never   • Drug use: Never      E-Cigarette/Vaping   • E-Cigarette Use Never User       E-Cigarette/Vaping Substances   • Nicotine No    • THC No    • CBD No    • Flavoring No    • Other No    • Unknown No       I have reviewed and agree with the history as documented.     Patient is a 58-year-old male no past medical history presenting with fall and right-sided chest pain.  Patient notes a fall off of a 15 foot ladder onto dirt 4 to 5 days ago.  States he did not hit his head or pass out but fell onto his right side.  Notes right sided chest pain which is nonradiating to the entire anterior right chest extending down to the right lower chest from the collarbone constant since that time and worse with deep breathing, laughing, coughing.  Does note episode of hemoptysis which he states was spots of blood.  Does note shortness of breath in same timeframe.  Denies any pain to the arms,  legs, numbness or tingling or weakness, vision changes, dizziness, headache, neck pain, back pain, abdominal pain.  Took Aleve in previous days with minimal relief.        Review of Systems   All other systems reviewed and are negative.          Objective       ED Triage Vitals [10/05/24 2137]   Temperature Pulse Blood Pressure Respirations SpO2 Patient Position - Orthostatic VS   (!) 97.3 °F (36.3 °C) 76 140/86 18 99 % --      Temp src Heart Rate Source BP Location FiO2 (%) Pain Score    -- -- -- -- --      Vitals      Date and Time Temp Pulse SpO2 Resp BP Pain Score FACES Pain Rating User   10/05/24 2142 -- -- 99 % -- -- -- -- CC   10/05/24 2137 97.3 °F (36.3 °C) 76 99 % 18 140/86 -- -- EN            Physical Exam  Vitals reviewed.   Constitutional:       General: He is not in acute distress.     Appearance: Normal appearance. He is not ill-appearing.   HENT:      Head: Normocephalic and atraumatic.      Mouth/Throat:      Mouth: Mucous membranes are moist.   Eyes:      Conjunctiva/sclera: Conjunctivae normal.      Pupils: Pupils are equal, round, and reactive to light.   Cardiovascular:      Rate and Rhythm: Normal rate and regular rhythm.      Pulses: Normal pulses.      Heart sounds: Normal heart sounds.   Pulmonary:      Effort: Pulmonary effort is normal.      Breath sounds: Normal breath sounds.      Comments: Right lateral and anterior upper and lower chest tenderness without ecchymosis or signs of trauma  Abdominal:      General: Abdomen is flat.      Palpations: Abdomen is soft.      Tenderness: There is no abdominal tenderness.   Musculoskeletal:         General: No swelling. Normal range of motion.      Cervical back: Normal range of motion and neck supple. No tenderness.      Right lower leg: No edema.      Left lower leg: No edema.   Skin:     General: Skin is warm and dry.   Neurological:      General: No focal deficit present.      Mental Status: He is alert.      Motor: No weakness.   Psychiatric:          Mood and Affect: Mood normal.         Results Reviewed       None            XR chest 1 view portable    (Results Pending)   CT chest abdomen pelvis w contrast    (Results Pending)       ECG 12 Lead Documentation Only    Date/Time: 10/5/2024 9:56 PM    Performed by: Valarie Torres DO  Authorized by: Valarie Torres DO    Patient location:  ED  Previous ECG:     Previous ECG:  Compared to current    Comparison ECG info:  Some anterior T wave flattening  Interpretation:     Interpretation: non-specific    Rate:     ECG rate assessment: normal    Rhythm:     Rhythm: sinus rhythm    Ectopy:     Ectopy: none    QRS:     QRS axis:  Right    QRS intervals:  Normal  Conduction:     Conduction: normal    ST segments:     ST segments:  Normal  T waves:     T waves: non-specific        ED Medication and Procedure Management   Prior to Admission Medications   Prescriptions Last Dose Informant Patient Reported? Taking?   HYDROcodone-acetaminophen (NORCO) 5-325 mg per tablet  Self No No   Sig: Take 1 tablet by mouth every 6 (six) hours as needed for pain for up to 20 doses Max Daily Amount: 4 tablets   Patient not taking: Reported on 12/27/2022   cyclobenzaprine (FLEXERIL) 10 mg tablet  Self No No   Sig: Take 1 tablet (10 mg total) by mouth 3 (three) times a day as needed for muscle spasms for up to 30 doses   Patient not taking: Reported on 11/1/2022      Facility-Administered Medications: None     Patient's Medications   Discharge Prescriptions    No medications on file     No discharge procedures on file.  ED SEPSIS DOCUMENTATION            Valarie Torres DO  10/06/24 0409

## 2025-06-04 ENCOUNTER — APPOINTMENT (EMERGENCY)
Dept: CT IMAGING | Facility: HOSPITAL | Age: 60
End: 2025-06-04
Payer: COMMERCIAL

## 2025-06-04 ENCOUNTER — HOSPITAL ENCOUNTER (EMERGENCY)
Facility: HOSPITAL | Age: 60
Discharge: HOME | End: 2025-06-04
Attending: EMERGENCY MEDICINE
Payer: COMMERCIAL

## 2025-06-04 VITALS
SYSTOLIC BLOOD PRESSURE: 118 MMHG | DIASTOLIC BLOOD PRESSURE: 72 MMHG | OXYGEN SATURATION: 98 % | RESPIRATION RATE: 18 BRPM | TEMPERATURE: 98.3 F | HEART RATE: 74 BPM

## 2025-06-04 DIAGNOSIS — M54.50 ACUTE LOW BACK PAIN: Primary | ICD-10-CM

## 2025-06-04 DIAGNOSIS — M47.816 DEGENERATIVE JOINT DISEASE (DJD) OF LUMBAR SPINE: ICD-10-CM

## 2025-06-04 DIAGNOSIS — M54.16 LUMBAR RADICULOPATHY, ACUTE: ICD-10-CM

## 2025-06-04 LAB
ANION GAP SERPL CALCULATED.3IONS-SCNC: 6 MMOL/L (ref 4–13)
BASOPHILS # BLD AUTO: 0.12 THOUSANDS/ÂΜL (ref 0–0.1)
BASOPHILS NFR BLD AUTO: 1 % (ref 0–1)
BILIRUB UR QL STRIP: NEGATIVE
BUN SERPL-MCNC: 10 MG/DL (ref 5–25)
CALCIUM SERPL-MCNC: 10.3 MG/DL (ref 8.4–10.2)
CHLORIDE SERPL-SCNC: 108 MMOL/L (ref 96–108)
CLARITY UR: CLEAR
CO2 SERPL-SCNC: 26 MMOL/L (ref 21–32)
COLOR UR: YELLOW
CREAT SERPL-MCNC: 0.81 MG/DL (ref 0.6–1.3)
EOSINOPHIL # BLD AUTO: 0.51 THOUSAND/ÂΜL (ref 0–0.61)
EOSINOPHIL NFR BLD AUTO: 5 % (ref 0–6)
ERYTHROCYTE [DISTWIDTH] IN BLOOD BY AUTOMATED COUNT: 13.5 % (ref 11.6–15.1)
GFR SERPL CREATININE-BSD FRML MDRD: 98 ML/MIN/1.73SQ M
GLUCOSE SERPL-MCNC: 109 MG/DL (ref 65–140)
GLUCOSE UR STRIP-MCNC: NEGATIVE MG/DL
HCT VFR BLD AUTO: 43.9 % (ref 36.5–49.3)
HGB BLD-MCNC: 15.1 G/DL (ref 12–17)
HGB UR QL STRIP.AUTO: NEGATIVE
IMM GRANULOCYTES # BLD AUTO: 0.11 THOUSAND/UL (ref 0–0.2)
IMM GRANULOCYTES NFR BLD AUTO: 1 % (ref 0–2)
KETONES UR STRIP-MCNC: NEGATIVE MG/DL
LEUKOCYTE ESTERASE UR QL STRIP: NEGATIVE
LYMPHOCYTES # BLD AUTO: 3.18 THOUSANDS/ÂΜL (ref 0.6–4.47)
LYMPHOCYTES NFR BLD AUTO: 28 % (ref 14–44)
MCH RBC QN AUTO: 32.3 PG (ref 26.8–34.3)
MCHC RBC AUTO-ENTMCNC: 34.4 G/DL (ref 31.4–37.4)
MCV RBC AUTO: 94 FL (ref 82–98)
MONOCYTES # BLD AUTO: 0.99 THOUSAND/ÂΜL (ref 0.17–1.22)
MONOCYTES NFR BLD AUTO: 9 % (ref 4–12)
NEUTROPHILS # BLD AUTO: 6.31 THOUSANDS/ÂΜL (ref 1.85–7.62)
NEUTS SEG NFR BLD AUTO: 56 % (ref 43–75)
NITRITE UR QL STRIP: NEGATIVE
NRBC BLD AUTO-RTO: 0 /100 WBCS
PH UR STRIP.AUTO: 5.5 [PH]
PLATELET # BLD AUTO: 252 THOUSANDS/UL (ref 149–390)
PMV BLD AUTO: 8.8 FL (ref 8.9–12.7)
POTASSIUM SERPL-SCNC: 4.1 MMOL/L (ref 3.5–5.3)
PROT UR STRIP-MCNC: NEGATIVE MG/DL
RBC # BLD AUTO: 4.67 MILLION/UL (ref 3.88–5.62)
SODIUM SERPL-SCNC: 140 MMOL/L (ref 135–147)
SP GR UR STRIP.AUTO: 1.03 (ref 1–1.03)
UROBILINOGEN UR STRIP-ACNC: <2 MG/DL
WBC # BLD AUTO: 11.22 THOUSAND/UL (ref 4.31–10.16)

## 2025-06-04 PROCEDURE — 99285 EMERGENCY DEPT VISIT HI MDM: CPT | Performed by: PHYSICIAN ASSISTANT

## 2025-06-04 PROCEDURE — 80048 BASIC METABOLIC PNL TOTAL CA: CPT | Performed by: PHYSICIAN ASSISTANT

## 2025-06-04 PROCEDURE — 96375 TX/PRO/DX INJ NEW DRUG ADDON: CPT

## 2025-06-04 PROCEDURE — 81003 URINALYSIS AUTO W/O SCOPE: CPT | Performed by: PHYSICIAN ASSISTANT

## 2025-06-04 PROCEDURE — 36415 COLL VENOUS BLD VENIPUNCTURE: CPT | Performed by: PHYSICIAN ASSISTANT

## 2025-06-04 PROCEDURE — 71275 CT ANGIOGRAPHY CHEST: CPT

## 2025-06-04 PROCEDURE — 85025 COMPLETE CBC W/AUTO DIFF WBC: CPT | Performed by: PHYSICIAN ASSISTANT

## 2025-06-04 PROCEDURE — 96376 TX/PRO/DX INJ SAME DRUG ADON: CPT

## 2025-06-04 PROCEDURE — 99283 EMERGENCY DEPT VISIT LOW MDM: CPT

## 2025-06-04 PROCEDURE — 74174 CTA ABD&PLVS W/CONTRAST: CPT

## 2025-06-04 PROCEDURE — 96374 THER/PROPH/DIAG INJ IV PUSH: CPT

## 2025-06-04 RX ORDER — HYDROMORPHONE HCL/PF 1 MG/ML
0.5 SYRINGE (ML) INJECTION ONCE
Status: COMPLETED | OUTPATIENT
Start: 2025-06-04 | End: 2025-06-04

## 2025-06-04 RX ORDER — HYDROMORPHONE HCL/PF 1 MG/ML
1 SYRINGE (ML) INJECTION ONCE
Refills: 0 | Status: COMPLETED | OUTPATIENT
Start: 2025-06-04 | End: 2025-06-04

## 2025-06-04 RX ORDER — LIDOCAINE 50 MG/G
1 PATCH TOPICAL DAILY
Qty: 14 PATCH | Refills: 0 | Status: SHIPPED | OUTPATIENT
Start: 2025-06-04

## 2025-06-04 RX ORDER — PREDNISONE 10 MG/1
TABLET ORAL
Qty: 20 TABLET | Refills: 0 | Status: SHIPPED | OUTPATIENT
Start: 2025-06-04

## 2025-06-04 RX ORDER — IBUPROFEN 400 MG/1
400 TABLET, FILM COATED ORAL EVERY 6 HOURS PRN
Qty: 30 TABLET | Refills: 0 | Status: SHIPPED | OUTPATIENT
Start: 2025-06-04

## 2025-06-04 RX ORDER — OXYCODONE HYDROCHLORIDE 5 MG/1
10 TABLET ORAL EVERY 6 HOURS PRN
Qty: 40 TABLET | Refills: 0 | Status: SHIPPED | OUTPATIENT
Start: 2025-06-04 | End: 2025-06-14

## 2025-06-04 RX ORDER — ONDANSETRON 2 MG/ML
4 INJECTION INTRAMUSCULAR; INTRAVENOUS ONCE
Status: COMPLETED | OUTPATIENT
Start: 2025-06-04 | End: 2025-06-04

## 2025-06-04 RX ORDER — KETOROLAC TROMETHAMINE 30 MG/ML
30 INJECTION, SOLUTION INTRAMUSCULAR; INTRAVENOUS ONCE
Status: COMPLETED | OUTPATIENT
Start: 2025-06-04 | End: 2025-06-04

## 2025-06-04 RX ORDER — METHYLPREDNISOLONE SODIUM SUCCINATE 125 MG/2ML
80 INJECTION, POWDER, LYOPHILIZED, FOR SOLUTION INTRAMUSCULAR; INTRAVENOUS ONCE
Status: COMPLETED | OUTPATIENT
Start: 2025-06-04 | End: 2025-06-04

## 2025-06-04 RX ADMIN — HYDROMORPHONE HYDROCHLORIDE 0.5 MG: 1 INJECTION, SOLUTION INTRAMUSCULAR; INTRAVENOUS; SUBCUTANEOUS at 11:28

## 2025-06-04 RX ADMIN — ONDANSETRON 4 MG: 2 INJECTION INTRAMUSCULAR; INTRAVENOUS at 10:15

## 2025-06-04 RX ADMIN — KETOROLAC TROMETHAMINE 30 MG: 30 INJECTION, SOLUTION INTRAMUSCULAR; INTRAVENOUS at 10:15

## 2025-06-04 RX ADMIN — IOHEXOL 100 ML: 350 INJECTION, SOLUTION INTRAVENOUS at 10:56

## 2025-06-04 RX ADMIN — HYDROMORPHONE HYDROCHLORIDE 1 MG: 1 INJECTION, SOLUTION INTRAMUSCULAR; INTRAVENOUS; SUBCUTANEOUS at 10:15

## 2025-06-04 RX ADMIN — METHYLPREDNISOLONE SODIUM SUCCINATE 80 MG: 125 INJECTION, POWDER, FOR SOLUTION INTRAMUSCULAR; INTRAVENOUS at 10:20

## 2025-06-04 NOTE — ED PROVIDER NOTES
Time reflects when diagnosis was documented in both MDM as applicable and the Disposition within this note       Time User Action Codes Description Comment    6/4/2025 11:55 AM Natanael Barahona Add [M54.50] Acute low back pain     6/4/2025 11:55 AM Natanael Barahona Add [M47.816] Degenerative joint disease (DJD) of lumbar spine     6/4/2025 11:55 AM Natanael Barahona Add [M54.16] Lumbar radiculopathy, acute           ED Disposition       ED Disposition   Discharge    Condition   Stable    Date/Time   Wed Jun 4, 2025 11:54 AM    Comment   Norman Mahmood discharge to home/self care.                   Assessment & Plan       Medical Decision Making  [facility] Narrative:  History and Clinical Impression:  59 y.o. male presents to ED for evaluation of right sided back pain radiating to upper back and right leg.  Further details in HPI.    DDx - Based on my history and physical examination:    most likely musculoskeletal back pain,  also consider AAA, pyelonephritis, kidney stone, vertebral compression fracture, epidural abscess or hematoma, retroperitoneal bleed, cauda equina, spinal stenosis, cancer, osteomyelitis,    Given the large differential diagnosis for this patient, the decision making in this case is of high complexity.    Past Medical History, Surgical History, Social History, Medications and allergies reviewed      Plan:  CTA to rule out dissection/AAA, recon lumbar spine to evaluate for compression fracture or cord compression,  labs including UA, IV dilaudid, Tordaol and Solumedrol for back pain and reassess    Diagnostic Results & Interpretation: Patient has been medically screened for potential limb- or life-threatening conditions that may lead to permanent organ injury or dysfunctionan. Initial history raised concern for an acute emergent process.and advanced imaging was obtained.       CBC abnormal, nonspecific leukocytosis may be related to volume contraction, stress response or infection.  No anemia.  BMP no  acute abnormalities, normal renal function. See ED course for additional results.  Results reviewed with patient and SO at bedside.      Treatment & Reassessment:  No red flag back pain signs/symptoms on exam:  Clinical exam is reassuring.  Pain improved with rest and analgesics in ED.   Pain relieved by lying flat, worse with bending/twisthing.  No swelling to low back or lower extremities, no pulsations in leg or thigh.   No severe weakness or loss of sensation to low back, lower extremities, perineum, or genitals. No fevers, chills, nights sweats or unexplained weight loss.  No change in color of the skin over the legs or feet (vascular insufficiency) or unhealing lower extremity wounds.  No partial or total loss of bladder and/or bowel control, no difficulty in passing urine or having a bowel movement, no hematuria.  Pain not severe or intractable in ED.      TX: Dilaudid, 1 mg IV followed by 0.5 mg IV, toradol 30 mg IV, Solumedrol 80 mg IV x 1 dose and Zofran 4 mg IV    Patient improved on reassessment.  Pain improved.  Able to ambulate in ED.  On exam. Vitals are stable.  alert and oriented x 4, GCS 15.  Heart regular rate and rhythm, lungs clear to auscultation.  Abdominal exam reveals normal bowel sounds x 4, no tenderness. M/S SARABIA x 4, no gross deformity, warm soft and well perfused. Remainder physical exam without acute abnormality.     Final Assessment and Disposition:  (see ED course for additional MDM):   Dx:  musculoskeletal low back pain secondary to lumbar DJD and mild right foraminal stenosis.  Additional emergency causes including ruptured AAA, kidney stones, pyelonephritis, retroperitoneal bleeding, vertebral compression fracture or central cord compression have been rule out.     -No red flag back pain signs or symptoms.    -MRI not indicated.   -No Admission Criteria Present:    -Patient without extreme pain refractory to medical treatment.     Disposition: Stable for discharge and outpatient  "management.     -  Will treat for relief of acute symptoms, but discussed does not offer long lasting benefit.   -  Discussed use of NSAIDs, Tylenol and prescribed medications for Pain if no contraindication.    -  Narcotics for severe pain. Standard narcotic precautions given.    -  Discussed rest, ice, avoidance of aggravating activities  -  Reviewed diagnosis of back pain/Patient education performed.    -  Instructed to follow up with pcp and orthopedics/ physical therapy/neurosurgery for further evaluation   -  I discussed diagnosis and treatment plan with patient at bedside.    -  Extended discussion with patient regarding the diagnosis, pathophysiology, expectant coarse and treatment plan.    -  Reviewed reasons to return to ed including but not limited to urinary retention, bowel or bladder incontinence, fevers of 100.4 F or higher, lower extremity weakness/paralysis, worsening pain or any other worsening or worrisome symptoms.    -  Patient verbalized understanding and agreement of same.  -  Patient verbalized understanding of diagnosis and agreement with discharge plan of care as well as understanding of reasons to return to ed.    Risk Assessment, Medical Decision Making and Disclaimers:     I reviewed the past medical, surgical, and social history, vital signs, nursing notes, and other relevant ancillary testing/information. I had a detailed discussion with the patient regarding the historical points, examination findings, and any diagnostic results    Patient was medically evaluated for potential limb- or life-threatening conditions.  History, clinical evaluation, and diagnostic results were used to narrow differential diagnosis and arrive at a final assessment.     Portions of the record may have been created with voice recognition software. Occasional wrong word or \"sound a like\" substitutions may have occurred due to the inherent limitations of voice recognition software. Read the chart carefully and " recognize, using context, where substitutions have occurred.         Amount and/or Complexity of Data Reviewed  Labs: ordered. Decision-making details documented in ED Course.  Radiology: ordered. Decision-making details documented in ED Course.    Risk  Prescription drug management.  Risk Details:           ED Course as of 06/04/25 1609   Wed Jun 04, 2025   1123 Reassessment.  Patient reported pain initially improved after IV dilaudid, toradol and solumedrol in ED.  Pt reports pain is returning.  Will repeat dilaudid and reassess.  Labs reviewed at bedside. No acute abnormalities.  CT pending.    1146 CTA dissection protocol chest abdomen pelvis w wo contrast  Interpretation normal.  No evidence for aortic dissection.     No acute fractures identified of the lumbar spine. Mild degenerative changes of the lumbar spine.     1147 UA w Reflex to Microscopic w Reflex to Culture(!)  Interpretation normal, negative for infection or hematuria         Medications   ketorolac (TORADOL) injection 30 mg (30 mg Intravenous Given 6/4/25 1015)   HYDROmorphone (DILAUDID) injection 1 mg (1 mg Intravenous Given 6/4/25 1015)   methylPREDNISolone sodium succinate (Solu-MEDROL) injection 80 mg (80 mg Intravenous Given 6/4/25 1020)   ondansetron (ZOFRAN) injection 4 mg (4 mg Intravenous Given 6/4/25 1015)   iohexol (OMNIPAQUE) 350 MG/ML injection (MULTI-DOSE) 100 mL (100 mL Intravenous Given 6/4/25 1056)   HYDROmorphone (DILAUDID) injection 0.5 mg (0.5 mg Intravenous Given 6/4/25 1128)       ED Risk Strat Scores                    No data recorded        SBIRT 22yo+      Flowsheet Row Most Recent Value   Initial Alcohol Screen: US AUDIT-C     1. How often do you have a drink containing alcohol? 1 Filed at: 06/04/2025 0958   2. How many drinks containing alcohol do you have on a typical day you are drinking?  1 Filed at: 06/04/2025 0958   3a. Male UNDER 65: How often do you have five or more drinks on one occasion? 0 Filed at: 06/04/2025  0958   3b. FEMALE Any Age, or MALE 65+: How often do you have 4 or more drinks on one occassion? 0 Filed at: 06/04/2025 0958   Audit-C Score 2 Filed at: 06/04/2025 0958   NARDA: How many times in the past year have you...    Used an illegal drug or used a prescription medication for non-medical reasons? Never Filed at: 06/04/2025 0958                            History of Present Illness       Chief Complaint   Patient presents with    Back Pain     Came in for a chronic back pain, denies trauma.       Past Medical History[1]   Past Surgical History[2]   Family History[3]   Social History[4]   E-Cigarette/Vaping    E-Cigarette Use Never User       E-Cigarette/Vaping Substances    Nicotine No     THC No     CBD No     Flavoring No     Other No     Unknown No       I have reviewed and agree with the history as documented.     59 y.o. male presents to the Emergency Department with chief complaint of back pain.   Onset of symptoms is reported as 2 weeks ago  Location of symptoms is reported as right lower back with radiation down right right, to right groin and to right mid back  Quality of symptoms is reported as sharp tight pain  Severity of symptoms is reported as moderate-severe  Associated symptoms:  Denies urinary retention.  Denies bowel or bladder incontinence.  Denies abdominal pain.  Denies fevers.  denies lower extremity paralysis, paraesthesias or weakness.  Denies dysuria, urinary frequency or hematuria. Denies weight loss or night sweats.   Modifiers:  Movement, bending and twisting exacerbate pain.  Rest partially relieves pain.  Context:  Patient reports no acute fall or trauma.  Denies prior history of IVDA, prolonged steroid use or immunocompromised state.       PMH, PSH and social history reviewed.  Nursing Notes and vital signs reviewed.         History provided by:  Patient and spouse   used: No    Back Pain  Associated symptoms: no abdominal pain, no chest pain, no fever, no  numbness and no weakness        Review of Systems   Constitutional:  Negative for appetite change, chills, diaphoresis, fever and unexpected weight change.   Eyes:  Negative for visual disturbance.   Respiratory:  Negative for chest tightness, shortness of breath and stridor.    Cardiovascular:  Negative for chest pain.   Gastrointestinal:  Negative for abdominal pain, nausea and vomiting.   Genitourinary:  Negative for decreased urine volume, difficulty urinating, flank pain, hematuria and urgency.   Musculoskeletal:  Positive for back pain and myalgias. Negative for gait problem and neck stiffness.   Skin:  Negative for rash.   Neurological:  Negative for tremors, seizures, syncope, facial asymmetry, weakness and numbness.   All other systems reviewed and are negative.          Objective       ED Triage Vitals   Temperature Pulse Blood Pressure Respirations SpO2 Patient Position - Orthostatic VS   06/04/25 0955 06/04/25 0955 06/04/25 0955 06/04/25 0955 06/04/25 0955 06/04/25 0955   98.3 °F (36.8 °C) 92 136/67 22 98 % Sitting      Temp Source Heart Rate Source BP Location FiO2 (%) Pain Score    06/04/25 0955 06/04/25 0955 06/04/25 0955 -- 06/04/25 1128    Temporal Monitor Left arm  6      Vitals      Date and Time Temp Pulse SpO2 Resp BP Pain Score FACES Pain Rating User   06/04/25 1200 -- 74 98 % 18 118/72 -- -- AM   06/04/25 1128 -- -- -- -- -- 6 -- AM   06/04/25 1032 -- 80 94 % -- 131/78 -- -- AM   06/04/25 1022 -- 87 96 % -- 126/84 -- -- AM   06/04/25 0955 98.3 °F (36.8 °C) 92 98 % 22 136/67 -- -- GP            Physical Exam  Vitals and nursing note reviewed.   Constitutional:       General: He is not in acute distress.     Appearance: Normal appearance.   HENT:      Head: Normocephalic and atraumatic.      Right Ear: External ear normal.      Left Ear: External ear normal.      Nose: Nose normal.     Eyes:      General: No scleral icterus.     Extraocular Movements: Extraocular movements intact.       Conjunctiva/sclera: Conjunctivae normal.       Cardiovascular:      Rate and Rhythm: Normal rate and regular rhythm.      Pulses: Normal pulses.   Pulmonary:      Effort: Pulmonary effort is normal.      Breath sounds: Normal breath sounds.   Abdominal:      Palpations: There is no mass.      Tenderness: There is no abdominal tenderness. There is no guarding or rebound.     Musculoskeletal:         General: Tenderness present. No deformity or signs of injury.      Cervical back: Normal range of motion and neck supple. No rigidity.      Thoracic back: Tenderness present. No bony tenderness. Decreased range of motion.      Lumbar back: Tenderness present. No bony tenderness. Decreased range of motion. Positive right straight leg raise test.        Back:      Skin:     General: Skin is dry.      Coloration: Skin is not jaundiced.      Findings: No rash.     Neurological:      General: No focal deficit present.      Mental Status: He is alert and oriented to person, place, and time. Mental status is at baseline.      Sensory: No sensory deficit.      Motor: No weakness.      Gait: Gait normal.     Psychiatric:         Mood and Affect: Mood normal.         Behavior: Behavior normal.         Thought Content: Thought content normal.         Results Reviewed       Procedure Component Value Units Date/Time    UA w Reflex to Microscopic w Reflex to Culture [[id number]]  (Abnormal) Collected: 06/04/25 1112    Lab Status: Final result Specimen: Urine, Clean Catch Updated: 06/04/25 1121     Color, UA Yellow     Clarity, UA Clear     Specific Gravity, UA 1.032     pH, UA 5.5     Leukocytes, UA Negative     Nitrite, UA Negative     Protein, UA Negative mg/dl      Glucose, UA Negative mg/dl      Ketones, UA Negative mg/dl      Urobilinogen, UA <2.0 mg/dl      Bilirubin, UA Negative     Occult Blood, UA Negative    Basic metabolic panel [[id number]]  (Abnormal) Collected: 06/04/25 1016    Lab Status: Final result Specimen: Blood from  Arm, Right Updated: 06/04/25 1042     Sodium 140 mmol/L      Potassium 4.1 mmol/L      Chloride 108 mmol/L      CO2 26 mmol/L      ANION GAP 6 mmol/L      BUN 10 mg/dL      Creatinine 0.81 mg/dL      Glucose 109 mg/dL      Calcium 10.3 mg/dL      eGFR 98 ml/min/1.73sq m     Narrative:      National Kid[employer] Foundation guidelines for Chronic Kidney Disease (CKD):     Stage 1 with normal or high GFR (GFR > 90 mL/min/1.73 square meters)    Stage 2 Mild CKD (GFR = 60-89 mL/min/1.73 square meters)    Stage 3A Moderate CKD (GFR = 45-59 mL/min/1.73 square meters)    Stage 3B Moderate CKD (GFR = 30-44 mL/min/1.73 square meters)    Stage 4 Severe CKD (GFR = 15-29 mL/min/1.73 square meters)    Stage 5 End Stage CKD (GFR <15 mL/min/1.73 square meters)  Note: GFR calculation is accurate only with a steady state creatinine    CBC and differential [[id number]]  (Abnormal) Collected: 06/04/25 1016    Lab Status: Final result Specimen: Blood from Arm, Right Updated: 06/04/25 1021     WBC 11.22 Thousand/uL      RBC 4.67 Million/uL      Hemoglobin 15.1 g/dL      Hematocrit 43.9 %      MCV 94 fL      MCH 32.3 pg      MCHC 34.4 g/dL      RDW 13.5 %      MPV 8.8 fL      Platelets 252 Thousands/uL      nRBC 0 /100 WBCs      Segmented % 56 %      Immature Grans % 1 %      Lymphocytes % 28 %      Monocytes % 9 %      Eosinophils Relative 5 %      Basophils Relative 1 %      Absolute Neutrophils 6.31 Thousands/µL      Absolute Immature Grans 0.11 Thousand/uL      Absolute Lymphocytes 3.18 Thousands/µL      Absolute Monocytes 0.99 Thousand/µL      Eosinophils Absolute 0.51 Thousand/µL      Basophils Absolute 0.12 Thousands/µL             CTA dissection protocol chest abdomen pelvis w wo contrast   Final Interpretation by Corby Merchant MD (06/04 1135)      No evidence for aortic dissection.      No acute fractures identified of the lumbar spine. Mild degenerative changes of the lumbar spine.            Computerized Assisted Algorithm (CAA)  may have aided analysis of applicable images.            Workstation performed: [id number]         CT recon only lumbar spine (No Charge)   Final Interpretation by Corby Merchant MD (06/04 1135)      No evidence for aortic dissection.      No acute fractures identified of the lumbar spine. Mild degenerative changes of the lumbar spine.            Computerized Assisted Algorithm (CAA) may have aided analysis of applicable images.            Workstation performed: [id number]             Procedures    ED Medication and Procedure Management   Prior to Admission Medications   Prescriptions Last Dose Informant Patient Reported? Taking?   HYDROcodone-acetaminophen (NORCO) 5-325 mg per tablet  Self No No   Sig: Take 1 tablet by mouth every 6 (six) hours as needed for pain for up to 20 doses Max Daily Amount: 4 tablets   Patient not taking: Reported on 12/27/2022   cyclobenzaprine (FLEXERIL) 10 mg tablet  Self No No   Sig: Take 1 tablet (10 mg total) by mouth 3 (three) times a day as needed for muscle spasms for up to 30 doses   Patient not taking: Reported on 11/1/2022   lidocaine (LIDODERM) 5 %   No No   Sig: Apply 1 patch topically over 12 hours daily Remove & Discard patch within 12 hours or as directed by MD   morphine (MSIR) 15 mg tablet   No No   Sig: Take 1 tablet (15 mg total) by mouth every 6 (six) hours as needed for severe pain for up to 20 doses Max Daily Amount: 60 mg      Facility-Administered Medications: None     Discharge Medication List as of 6/4/2025 12:22 PM        START taking these medications    Details   ibuprofen (MOTRIN) 400 mg tablet Take 1 tablet (400 mg total) by mouth every 6 (six) hours as needed for moderate pain, Starting Wed 6/4/2025, Normal      !! lidocaine (Lidoderm) 5 % Apply 1 patch topically daily over 12 hours Remove & Discard patch within 12 hours or as directed by MD, Starting Wed 6/4/2025, Normal      oxyCODONE (Roxicodone) 5 immediate release tablet Take 2 tablets (10 mg total) by  mouth every 6 (six) hours as needed for moderate pain or severe pain (dx back pain/ongoing rx) for up to 10 days Max Daily Amount: 40 mg, Starting Wed 6/4/2025, Until Sat 6/14/2025 at 2359, Normal      predniSONE 10 mg tablet 40 mg po daily x 2 days then 30 mg PO daily x 2 days then 20 mg PO daily x 2 days then 10 mg PO daily x 2 days then stop., Normal      tiZANidine (ZANAFLEX) 4 mg tablet Take 1 tablet (4 mg total) by mouth every 8 (eight) hours as needed for muscle spasms, Starting Wed 6/4/2025, Normal       !! - Potential duplicate medications found. Please discuss with provider.        CONTINUE these medications which have NOT CHANGED    Details   cyclobenzaprine (FLEXERIL) 10 mg tablet Take 1 tablet (10 mg total) by mouth 3 (three) times a day as needed for muscle spasms for up to 30 doses, Starting Thu 8/29/2019, Print      HYDROcodone-acetaminophen (NORCO) 5-325 mg per tablet Take 1 tablet by mouth every 6 (six) hours as needed for pain for up to 20 doses Max Daily Amount: 4 tablets, Starting Wed 12/14/2022, Normal      !! lidocaine (LIDODERM) 5 % Apply 1 patch topically over 12 hours daily Remove & Discard patch within 12 hours or as directed by MD, Starting Sun 10/6/2024, Normal      morphine (MSIR) 15 mg tablet Take 1 tablet (15 mg total) by mouth every 6 (six) hours as needed for severe pain for up to 20 doses Max Daily Amount: 60 mg, Starting Sun 10/6/2024, Normal       !! - Potential duplicate medications found. Please discuss with provider.        No discharge procedures on file.  ED SEPSIS DOCUMENTATION   Time reflects when diagnosis was documented in both MDM as applicable and the Disposition within this note       Time User Action Codes Description Comment    6/4/2025 11:55 AM Natanael Barahona [M54.50] Acute low back pain     6/4/2025 11:55 AM Natanael Baraohna [M47.816] Degenerative joint disease (DJD) of lumbar spine     6/4/2025 11:55 AM Natanael Barahona [M54.16] Lumbar radiculopathy,  acute                    [1] No past medical history on file.  [2]   Past Surgical History:  Procedure Laterality Date    HAND SURGERY Right     HERNIA REPAIR      SC ARTHRODESIS INTERPHALANGEAL JT W/WO INT FIXJ Right 12/14/2022    Procedure: ARTHRODESIS / FUSION JOINT FINGER - right thumb;  Surgeon: Kaden Adames MD;  Location: [facility] MAIN OR;  Service: Orthopedics   [3] No family history on file.  [4]   Social History  Tobacco Use    Smoking status: Every Day     Current packs/day: 0.20     Types: Cigarettes    Smokeless tobacco: Never    Tobacco comments:     Smokes one pack a week   Vaping Use    Vaping status: Never Used   Substance Use Topics    Alcohol use: Never    Drug use: Never        Natanael Barahona PA-C  06/04/25 [id number]

## (undated) DEVICE — SUT VICRYL 2-0 SH 27 IN UNDYED J417H

## (undated) DEVICE — SUT FIBERWIRE 4-0 3/8 CIRCLE T12 38IN AR-7230-01

## (undated) DEVICE — CUFF TOURNIQUET 18 X 4 IN QUICK CONNECT DISP 1 BLADDER

## (undated) DEVICE — GLOVE SRG BIOGEL ECLIPSE 7

## (undated) DEVICE — GLOVE INDICATOR PI UNDERGLOVE SZ 7 BLUE

## (undated) DEVICE — STERILE BETHLEHEM PLASTIC HAND: Brand: CARDINAL HEALTH

## (undated) DEVICE — SUT MONOCRYL 4-0 PS-2 18 IN Y496G

## (undated) DEVICE — 26.0 MM, MICRO ACUTRAK 2® BONE SCREW
Type: IMPLANTABLE DEVICE | Site: FINGER | Status: NON-FUNCTIONAL
Brand: ACUMED
Removed: 2022-12-14

## (undated) DEVICE — TUBING SUCTION 5MM X 12 FT

## (undated) DEVICE — 3M™ STERI-STRIP™ BLEND TONE SKIN CLOSURES, B1557, TAN, 1/2 IN X 4 IN (12MM X 100MM), 6 STRIPS/ENVELOPE: Brand: 3M™ STERI-STRIP™

## (undated) DEVICE — MICRO ACUTRAK 2® EXTENDED LONG DRILL: Brand: ACUMED

## (undated) DEVICE — SPONGE SCRUB 4 PCT CHLORHEXIDINE

## (undated) DEVICE — ADHESIVE SKIN HIGH VISCOSITY EXOFIN 1ML

## (undated) DEVICE — SUT CHROMIC 5-0 P-3 18 IN 687G

## (undated) DEVICE — DRAPE C-ARM X-RAY

## (undated) DEVICE — GAUZE SPONGES,16 PLY: Brand: CURITY

## (undated) DEVICE — PADDING CAST 4 IN  COTTON STRL

## (undated) DEVICE — DISPOSABLE EQUIPMENT COVER: Brand: SMALL TOWEL DRAPE

## (undated) DEVICE — BURR OVAL 4 MM C0901

## (undated) DEVICE — 3M™ STERI-STRIP™ COMPOUND BENZOIN TINCTURE 40 BAGS/CARTON 4 CARTONS/CASE C1544: Brand: 3M™ STERI-STRIP™

## (undated) DEVICE — .035 X 6 IN DOUBLE TROCAR GUIDE WIRE: Brand: ACUMED

## (undated) DEVICE — SUT VICRYL 3-0 SH 27 IN J416H

## (undated) DEVICE — SPLINT 3 X 15 IN XFAST SET PLASTER

## (undated) DEVICE — .035 X 6 IN SINGLE TROCAR GUIDE WIRE
Type: IMPLANTABLE DEVICE | Site: FINGER | Status: NON-FUNCTIONAL
Brand: ACUMED
Removed: 2022-12-14

## (undated) DEVICE — STANDARD SURGICAL GOWN, L: Brand: CONVERTORS

## (undated) DEVICE — INTENDED FOR TISSUE SEPARATION, AND OTHER PROCEDURES THAT REQUIRE A SHARP SURGICAL BLADE TO PUNCTURE OR CUT.: Brand: BARD-PARKER ® CARBON RIB-BACK BLADES

## (undated) DEVICE — OCCLUSIVE GAUZE STRIP,3% BISMUTH TRIBROMOPHENATE IN PETROLATUM BLEND: Brand: XEROFORM